# Patient Record
Sex: FEMALE | Race: WHITE | NOT HISPANIC OR LATINO | Employment: OTHER | URBAN - NONMETROPOLITAN AREA
[De-identification: names, ages, dates, MRNs, and addresses within clinical notes are randomized per-mention and may not be internally consistent; named-entity substitution may affect disease eponyms.]

---

## 2023-08-29 ENCOUNTER — OFFICE VISIT (OUTPATIENT)
Dept: PRIMARY CARE | Facility: CLINIC | Age: 68
End: 2023-08-29
Payer: MEDICARE

## 2023-08-29 VITALS
TEMPERATURE: 97.5 F | OXYGEN SATURATION: 98 % | BODY MASS INDEX: 31.98 KG/M2 | HEIGHT: 62 IN | HEART RATE: 64 BPM | SYSTOLIC BLOOD PRESSURE: 140 MMHG | WEIGHT: 173.8 LBS | DIASTOLIC BLOOD PRESSURE: 82 MMHG

## 2023-08-29 DIAGNOSIS — E04.1 THYROID NODULE: ICD-10-CM

## 2023-08-29 DIAGNOSIS — E23.7 ABNORMALITY OF PITUITARY GLAND (MULTI): ICD-10-CM

## 2023-08-29 DIAGNOSIS — R92.8 ABNORMAL MAMMOGRAM: ICD-10-CM

## 2023-08-29 DIAGNOSIS — I10 ESSENTIAL HYPERTENSION: ICD-10-CM

## 2023-08-29 DIAGNOSIS — E66.9 CLASS 1 OBESITY WITH SERIOUS COMORBIDITY AND BODY MASS INDEX (BMI) OF 31.0 TO 31.9 IN ADULT, UNSPECIFIED OBESITY TYPE: ICD-10-CM

## 2023-08-29 DIAGNOSIS — Z78.0 MENOPAUSE: ICD-10-CM

## 2023-08-29 DIAGNOSIS — E11.65 TYPE 2 DIABETES MELLITUS WITH HYPERGLYCEMIA, WITHOUT LONG-TERM CURRENT USE OF INSULIN (MULTI): Primary | ICD-10-CM

## 2023-08-29 DIAGNOSIS — E78.2 MIXED HYPERLIPIDEMIA: ICD-10-CM

## 2023-08-29 PROBLEM — G25.81 RESTLESS LEG: Status: ACTIVE | Noted: 2023-08-29

## 2023-08-29 PROBLEM — E11.9 TYPE 2 DIABETES MELLITUS (MULTI): Status: ACTIVE | Noted: 2023-08-29

## 2023-08-29 PROBLEM — M43.16 SPONDYLOLISTHESIS AT L4-L5 LEVEL: Status: ACTIVE | Noted: 2023-08-29

## 2023-08-29 PROCEDURE — 1036F TOBACCO NON-USER: CPT | Performed by: NURSE PRACTITIONER

## 2023-08-29 PROCEDURE — 3008F BODY MASS INDEX DOCD: CPT | Performed by: NURSE PRACTITIONER

## 2023-08-29 PROCEDURE — 1160F RVW MEDS BY RX/DR IN RCRD: CPT | Performed by: NURSE PRACTITIONER

## 2023-08-29 PROCEDURE — 3044F HG A1C LEVEL LT 7.0%: CPT | Performed by: NURSE PRACTITIONER

## 2023-08-29 PROCEDURE — 1159F MED LIST DOCD IN RCRD: CPT | Performed by: NURSE PRACTITIONER

## 2023-08-29 PROCEDURE — 99204 OFFICE O/P NEW MOD 45 MIN: CPT | Performed by: NURSE PRACTITIONER

## 2023-08-29 PROCEDURE — 3077F SYST BP >= 140 MM HG: CPT | Performed by: NURSE PRACTITIONER

## 2023-08-29 PROCEDURE — 3079F DIAST BP 80-89 MM HG: CPT | Performed by: NURSE PRACTITIONER

## 2023-08-29 PROCEDURE — 4010F ACE/ARB THERAPY RXD/TAKEN: CPT | Performed by: NURSE PRACTITIONER

## 2023-08-29 RX ORDER — LANOLIN ALCOHOL/MO/W.PET/CERES
CREAM (GRAM) TOPICAL
COMMUNITY

## 2023-08-29 RX ORDER — DULAGLUTIDE 3 MG/.5ML
INJECTION, SOLUTION SUBCUTANEOUS
COMMUNITY
End: 2023-09-11 | Stop reason: SDUPTHER

## 2023-08-29 RX ORDER — PIOGLITAZONEHYDROCHLORIDE 30 MG/1
30 TABLET ORAL DAILY
COMMUNITY
Start: 2020-08-07 | End: 2023-09-11 | Stop reason: SDUPTHER

## 2023-08-29 RX ORDER — VIT C/E/ZN/COPPR/LUTEIN/ZEAXAN 250MG-90MG
1 CAPSULE ORAL EVERY 24 HOURS
COMMUNITY

## 2023-08-29 RX ORDER — AMLODIPINE BESYLATE 5 MG/1
TABLET ORAL
COMMUNITY
End: 2023-11-28 | Stop reason: SDUPTHER

## 2023-08-29 RX ORDER — EZETIMIBE 10 MG/1
10 TABLET ORAL DAILY
COMMUNITY
Start: 2020-10-04 | End: 2023-11-28 | Stop reason: SDUPTHER

## 2023-08-29 RX ORDER — POTASSIUM &MAGNESIUM ASPARTATE 250-250 MG
CAPSULE ORAL
COMMUNITY
Start: 2020-11-09

## 2023-08-29 RX ORDER — LOSARTAN POTASSIUM 100 MG/1
100 TABLET ORAL DAILY
COMMUNITY
Start: 2020-08-07 | End: 2023-11-29

## 2023-08-29 RX ORDER — DIPHENHYDRAMINE HCL 25 MG
TABLET ORAL EVERY 24 HOURS
COMMUNITY

## 2023-08-29 RX ORDER — HYDROCHLOROTHIAZIDE 25 MG/1
25 TABLET ORAL DAILY
COMMUNITY
Start: 2020-09-01 | End: 2023-11-28 | Stop reason: SDUPTHER

## 2023-08-29 ASSESSMENT — ENCOUNTER SYMPTOMS
PSYCHIATRIC NEGATIVE: 1
VOMITING: 0
NAUSEA: 0
WEAKNESS: 0
ENDOCRINE NEGATIVE: 1
NUMBNESS: 0
COUGH: 0
HEMATOLOGIC/LYMPHATIC NEGATIVE: 1
RHINORRHEA: 0
SORE THROAT: 0
FEVER: 0
DIARRHEA: 0
LIGHT-HEADEDNESS: 0
WHEEZING: 0
EYES NEGATIVE: 1
CHILLS: 0
CONSTIPATION: 0
ARTHRALGIAS: 1
SINUS PRESSURE: 0
HEADACHES: 0
CHEST TIGHTNESS: 0
ACTIVITY CHANGE: 0
DIZZINESS: 0
PALPITATIONS: 0
ABDOMINAL PAIN: 0
ABDOMINAL DISTENTION: 0
ALLERGIC/IMMUNOLOGIC NEGATIVE: 1
SHORTNESS OF BREATH: 0
FATIGUE: 0
SINUS PAIN: 0

## 2023-08-29 NOTE — PROGRESS NOTES
Subjective   Patient ID: Leigha Lopez is a 68 y.o. female who presents for New Patient Visit, Hypothyroidism (Thyroid biopsy this Thursday), Diabetes, and Hypertension.    New patient to office, previous patient of Dr. Fuentes  Diabetes, controlled, last A1c 6.9%.  Patient taking Trulicity 3 mg weekly, Actos 30 mg daily.  She has lost quite a bit of weight since moving here a few years ago.  She has not been watching her diet very well lately and is trying to get back on track.  Discussed lifestyle changes including portion control, food choices, and increased activity.  Hypertension, controlled, taking amlodipine 5 mg daily, losartan 100 mg daily, chlorothiazide 25 mg daily.  Thyroid nodules, following with ENT, she is scheduled for biopsy this Thursday.  Obesity, BMI 31.  Patient is working to get her diet back on track, she has lost a significant amount of weight already.  Abnormal mammogram, left in March.  She is due for follow-up imaging in September.  Order placed.  Hospitalized April after syncopal episode.  She was worked up by cardiology.  Following with Dr. Cedillo yearly now.  Syncopal episode was felt not to be cardiac related.  Incidental finding of possible pituitary adenoma on CT done in ER.  Although the addendum stated there was no abnormality.  She had an MRI which showed possible microadenoma, 6-month follow-up was recommended.  Order placed for December.  DEXA scan ordered    Preventive:  CRC screen:  Mammogram: 3/2023 likely benign, follow-up 9/2023  DEXA scan: Ordered  PAP:  CT cardiac scoring:  Ophthalmology:  Podiatry:  Urine albumin: 3/2023       Review of Systems   Constitutional:  Negative for activity change, chills, fatigue and fever.   HENT:  Negative for congestion, rhinorrhea, sinus pressure, sinus pain and sore throat.    Eyes: Negative.    Respiratory:  Negative for cough, chest tightness, shortness of breath and wheezing.    Cardiovascular:  Negative for chest pain, palpitations  "and leg swelling.   Gastrointestinal:  Negative for abdominal distention, abdominal pain, constipation, diarrhea, nausea and vomiting.   Endocrine: Negative.    Genitourinary: Negative.    Musculoskeletal:  Positive for arthralgias.   Skin: Negative.    Allergic/Immunologic: Negative.    Neurological:  Negative for dizziness, syncope, weakness, light-headedness, numbness and headaches.   Hematological: Negative.    Psychiatric/Behavioral: Negative.     All other systems reviewed and are negative.      Objective   /82   Pulse 64   Temp 36.4 °C (97.5 °F)   Ht 1.575 m (5' 2\")   Wt 78.8 kg (173 lb 12.8 oz)   SpO2 98%   BMI 31.79 kg/m²     Physical Exam  Vitals and nursing note reviewed.   Constitutional:       General: She is not in acute distress.     Appearance: Normal appearance. She is obese. She is not ill-appearing.   HENT:      Head: Normocephalic and atraumatic.      Right Ear: Tympanic membrane, ear canal and external ear normal.      Left Ear: Tympanic membrane, ear canal and external ear normal.      Nose: Nose normal.      Mouth/Throat:      Mouth: Mucous membranes are moist.      Pharynx: Oropharynx is clear.   Eyes:      Pupils: Pupils are equal, round, and reactive to light.   Cardiovascular:      Rate and Rhythm: Normal rate and regular rhythm.      Pulses: Normal pulses.      Heart sounds: Normal heart sounds. No murmur heard.  Pulmonary:      Effort: Pulmonary effort is normal. No respiratory distress.      Breath sounds: Normal breath sounds. No wheezing.   Abdominal:      General: Bowel sounds are normal. There is no distension.      Palpations: Abdomen is soft.      Tenderness: There is no abdominal tenderness.   Musculoskeletal:         General: No tenderness. Normal range of motion.      Cervical back: Normal range of motion and neck supple.      Right lower leg: No edema.      Left lower leg: No edema.   Skin:     General: Skin is warm and dry.      Capillary Refill: Capillary refill " takes less than 2 seconds.      Coloration: Skin is not jaundiced.   Neurological:      General: No focal deficit present.      Mental Status: She is alert and oriented to person, place, and time.      Motor: No weakness.   Psychiatric:         Mood and Affect: Mood normal.         Behavior: Behavior normal.         Thought Content: Thought content normal.         Judgment: Judgment normal.       Assessment/Plan     #Diabetes, controlled  -Continue Trulicity 3 mg weekly, Actos 30 mg daily  -Low flat/cholesterol, low sodium, carbohydrate controlled diet  -Exercise as tolerated 150 minutes/week, gradually increase activity  -Weight loss  -Regular follow-up with ophthalmology and podiatry  #Hypertension, controlled  -Continue amlodipine 5 mg daily, HCTZ 25 mg daily, losartan 100 mg daily  -Low fat/cholesterol, low sodium, low carbohydrate diet  -Exercise as tolerated 150 minutes/week, increase activity slowly  -Maintain healthy weight  #Obesity, BMI 31  -Avoid sweets and sugary drinks  -Drink plenty of water  -Avoid processed foods and refined foods  -Eat fruits, vegetables, lean protein, whole grains  -Increase your activity level  #Hyperlipidemia  -Intolerant of statins  -Continue Zetia  -Follow-up with cardiology  #Thyroid nodule  -Thyroid biopsy upcoming on Thursday    Follow-up 3 months and as needed

## 2023-08-31 ENCOUNTER — HOSPITAL ENCOUNTER (OUTPATIENT)
Dept: DATA CONVERSION | Facility: HOSPITAL | Age: 68
Discharge: HOME | End: 2023-08-31
Payer: MEDICARE

## 2023-08-31 DIAGNOSIS — E04.1 NONTOXIC SINGLE THYROID NODULE: ICD-10-CM

## 2023-09-11 DIAGNOSIS — E11.65 TYPE 2 DIABETES MELLITUS WITH HYPERGLYCEMIA, WITHOUT LONG-TERM CURRENT USE OF INSULIN (MULTI): ICD-10-CM

## 2023-09-11 RX ORDER — DULAGLUTIDE 3 MG/.5ML
INJECTION, SOLUTION SUBCUTANEOUS
Qty: 6 ML | Refills: 0 | Status: SHIPPED | OUTPATIENT
Start: 2023-09-11 | End: 2023-11-28 | Stop reason: SDUPTHER

## 2023-09-11 RX ORDER — PIOGLITAZONEHYDROCHLORIDE 30 MG/1
30 TABLET ORAL DAILY
Qty: 90 TABLET | Refills: 0 | Status: SHIPPED | OUTPATIENT
Start: 2023-09-11 | End: 2023-11-28 | Stop reason: SDUPTHER

## 2023-09-15 ENCOUNTER — LAB (OUTPATIENT)
Dept: LAB | Facility: LAB | Age: 68
End: 2023-09-15
Payer: MEDICARE

## 2023-09-15 ENCOUNTER — OFFICE VISIT (OUTPATIENT)
Dept: PRIMARY CARE | Facility: CLINIC | Age: 68
End: 2023-09-15
Payer: MEDICARE

## 2023-09-15 VITALS
DIASTOLIC BLOOD PRESSURE: 74 MMHG | OXYGEN SATURATION: 100 % | WEIGHT: 180.4 LBS | HEART RATE: 73 BPM | SYSTOLIC BLOOD PRESSURE: 150 MMHG | TEMPERATURE: 97.1 F | BODY MASS INDEX: 33 KG/M2

## 2023-09-15 DIAGNOSIS — H61.22 IMPACTED CERUMEN OF LEFT EAR: ICD-10-CM

## 2023-09-15 DIAGNOSIS — R30.0 DYSURIA: ICD-10-CM

## 2023-09-15 DIAGNOSIS — R42 VERTIGO: Primary | ICD-10-CM

## 2023-09-15 DIAGNOSIS — I10 ESSENTIAL HYPERTENSION: ICD-10-CM

## 2023-09-15 DIAGNOSIS — R42 VERTIGO: ICD-10-CM

## 2023-09-15 LAB
ALANINE AMINOTRANSFERASE (SGPT) (U/L) IN SER/PLAS: 11 U/L (ref 7–45)
ALBUMIN (G/DL) IN SER/PLAS: 4.1 G/DL (ref 3.4–5)
ALKALINE PHOSPHATASE (U/L) IN SER/PLAS: 66 U/L (ref 33–136)
ANION GAP IN SER/PLAS: 14 MMOL/L (ref 10–20)
APPEARANCE, URINE: CLEAR
ASPARTATE AMINOTRANSFERASE (SGOT) (U/L) IN SER/PLAS: 15 U/L (ref 9–39)
BASOPHILS (10*3/UL) IN BLOOD BY AUTOMATED COUNT: 0.05 X10E9/L (ref 0–0.1)
BASOPHILS/100 LEUKOCYTES IN BLOOD BY AUTOMATED COUNT: 0.6 % (ref 0–2)
BILIRUBIN TOTAL (MG/DL) IN SER/PLAS: 0.4 MG/DL (ref 0–1.2)
BILIRUBIN, URINE: NEGATIVE
BLOOD, URINE: NEGATIVE
CALCIUM (MG/DL) IN SER/PLAS: 9.4 MG/DL (ref 8.6–10.3)
CARBON DIOXIDE, TOTAL (MMOL/L) IN SER/PLAS: 25 MMOL/L (ref 21–32)
CHLORIDE (MMOL/L) IN SER/PLAS: 104 MMOL/L (ref 98–107)
COLOR, URINE: NORMAL
CREATININE (MG/DL) IN SER/PLAS: 0.86 MG/DL (ref 0.5–1.05)
EOSINOPHILS (10*3/UL) IN BLOOD BY AUTOMATED COUNT: 0.18 X10E9/L (ref 0–0.7)
EOSINOPHILS/100 LEUKOCYTES IN BLOOD BY AUTOMATED COUNT: 2 % (ref 0–6)
ERYTHROCYTE DISTRIBUTION WIDTH (RATIO) BY AUTOMATED COUNT: 13 % (ref 11.5–14.5)
ERYTHROCYTE MEAN CORPUSCULAR HEMOGLOBIN CONCENTRATION (G/DL) BY AUTOMATED: 32.9 G/DL (ref 32–36)
ERYTHROCYTE MEAN CORPUSCULAR VOLUME (FL) BY AUTOMATED COUNT: 97 FL (ref 80–100)
ERYTHROCYTES (10*6/UL) IN BLOOD BY AUTOMATED COUNT: 4.34 X10E12/L (ref 4–5.2)
GFR FEMALE: 73 ML/MIN/1.73M2
GLUCOSE (MG/DL) IN SER/PLAS: 128 MG/DL (ref 74–99)
GLUCOSE, URINE: NEGATIVE MG/DL
HEMATOCRIT (%) IN BLOOD BY AUTOMATED COUNT: 41.9 % (ref 36–46)
HEMOGLOBIN (G/DL) IN BLOOD: 13.8 G/DL (ref 12–16)
IMMATURE GRANULOCYTES/100 LEUKOCYTES IN BLOOD BY AUTOMATED COUNT: 0.3 % (ref 0–0.9)
KETONES, URINE: NEGATIVE MG/DL
LEUKOCYTE ESTERASE, URINE: NEGATIVE
LEUKOCYTES (10*3/UL) IN BLOOD BY AUTOMATED COUNT: 8.9 X10E9/L (ref 4.4–11.3)
LYMPHOCYTES (10*3/UL) IN BLOOD BY AUTOMATED COUNT: 2.67 X10E9/L (ref 1.2–4.8)
LYMPHOCYTES/100 LEUKOCYTES IN BLOOD BY AUTOMATED COUNT: 30 % (ref 13–44)
MONOCYTES (10*3/UL) IN BLOOD BY AUTOMATED COUNT: 0.61 X10E9/L (ref 0.1–1)
MONOCYTES/100 LEUKOCYTES IN BLOOD BY AUTOMATED COUNT: 6.9 % (ref 2–10)
NEUTROPHILS (10*3/UL) IN BLOOD BY AUTOMATED COUNT: 5.35 X10E9/L (ref 1.2–7.7)
NEUTROPHILS/100 LEUKOCYTES IN BLOOD BY AUTOMATED COUNT: 60.2 % (ref 40–80)
NITRITE, URINE: NEGATIVE
PH, URINE: 7 (ref 5–8)
PLATELETS (10*3/UL) IN BLOOD AUTOMATED COUNT: 161 X10E9/L (ref 150–450)
POTASSIUM (MMOL/L) IN SER/PLAS: 4 MMOL/L (ref 3.5–5.3)
PROTEIN TOTAL: 7.2 G/DL (ref 6.4–8.2)
PROTEIN, URINE: NEGATIVE MG/DL
SODIUM (MMOL/L) IN SER/PLAS: 139 MMOL/L (ref 136–145)
SPECIFIC GRAVITY, URINE: 1.01 (ref 1–1.03)
UREA NITROGEN (MG/DL) IN SER/PLAS: 27 MG/DL (ref 6–23)
UROBILINOGEN, URINE: <2 MG/DL (ref 0–1.9)

## 2023-09-15 PROCEDURE — 3044F HG A1C LEVEL LT 7.0%: CPT | Performed by: NURSE PRACTITIONER

## 2023-09-15 PROCEDURE — 3008F BODY MASS INDEX DOCD: CPT | Performed by: NURSE PRACTITIONER

## 2023-09-15 PROCEDURE — 85025 COMPLETE CBC W/AUTO DIFF WBC: CPT

## 2023-09-15 PROCEDURE — 80053 COMPREHEN METABOLIC PANEL: CPT

## 2023-09-15 PROCEDURE — 1036F TOBACCO NON-USER: CPT | Performed by: NURSE PRACTITIONER

## 2023-09-15 PROCEDURE — 3077F SYST BP >= 140 MM HG: CPT | Performed by: NURSE PRACTITIONER

## 2023-09-15 PROCEDURE — 4010F ACE/ARB THERAPY RXD/TAKEN: CPT | Performed by: NURSE PRACTITIONER

## 2023-09-15 PROCEDURE — 1160F RVW MEDS BY RX/DR IN RCRD: CPT | Performed by: NURSE PRACTITIONER

## 2023-09-15 PROCEDURE — 81003 URINALYSIS AUTO W/O SCOPE: CPT

## 2023-09-15 PROCEDURE — 99214 OFFICE O/P EST MOD 30 MIN: CPT | Performed by: NURSE PRACTITIONER

## 2023-09-15 PROCEDURE — 36415 COLL VENOUS BLD VENIPUNCTURE: CPT

## 2023-09-15 PROCEDURE — 1159F MED LIST DOCD IN RCRD: CPT | Performed by: NURSE PRACTITIONER

## 2023-09-15 PROCEDURE — 3078F DIAST BP <80 MM HG: CPT | Performed by: NURSE PRACTITIONER

## 2023-09-15 RX ORDER — MECLIZINE HCL 12.5 MG 12.5 MG/1
12.5 TABLET ORAL 3 TIMES DAILY PRN
Qty: 42 TABLET | Refills: 0 | Status: SHIPPED | OUTPATIENT
Start: 2023-09-15 | End: 2023-09-29

## 2023-09-15 ASSESSMENT — ENCOUNTER SYMPTOMS
FATIGUE: 0
EYES NEGATIVE: 1
CHEST TIGHTNESS: 0
ENDOCRINE NEGATIVE: 1
ABDOMINAL DISTENTION: 0
CONSTIPATION: 0
ACTIVITY CHANGE: 0
ABDOMINAL PAIN: 0
FEVER: 0
NAUSEA: 1
DIARRHEA: 0
ALLERGIC/IMMUNOLOGIC NEGATIVE: 1
SINUS PAIN: 0
SHORTNESS OF BREATH: 0
HEADACHES: 0
VOMITING: 0
LIGHT-HEADEDNESS: 0
ARTHRALGIAS: 1
COUGH: 0
NUMBNESS: 0
HEMATOLOGIC/LYMPHATIC NEGATIVE: 1
CHILLS: 1
RHINORRHEA: 0
WEAKNESS: 0
DIZZINESS: 1
SINUS PRESSURE: 0
PSYCHIATRIC NEGATIVE: 1
PALPITATIONS: 0
SORE THROAT: 0
DYSURIA: 1
WHEEZING: 0

## 2023-09-15 NOTE — PATIENT INSTRUCTIONS

## 2023-09-15 NOTE — PROGRESS NOTES
"Subjective   Patient ID: Leigha Lopez is a 68 y.o. female who presents for Dizziness, Nausea, Chills, Covid-19 Home Monitoring Visit (COVID negative), and Hypertension (Patient states BP has been low).    Dental work 9/7. Thyroid biopsy 8/31.  Sunday dysuria, \"bubble\" under rib cage. Chills since Tuesday  Denies fever, vomiting, diarrhea, chest pain, dyspnea, headache.  Vertigo after standing up from bed Tues morning, had blurred vision. Nausea, no vomiting.  Patient is unsteady due to vertigo.  Intact finger-to-nose.  Cranial nerves grossly intact.  No motor deficits.  Vertigo worse with looking up.  CBC, CMP unremarkable.  UA negative for UTI.  Stat CT of the head no acute ischemia or hemorrhage.  Start meclizine 3 times a day as needed.  Educated regarding fall precautions.  L cerumen impaction.  Patient to use Debrox for 4 days and return for ear flush.  Hypertension, controlled, taking amlodipine 5 mg daily, losartan 100 mg daily, chlorothiazide 25 mg daily    Lab on 09/15/2023  WBC                                           Date: 09/15/2023  Value: 8.9         Ref range: 4.4 - 11.3 x10E9*  Status: Final  RBC                                           Date: 09/15/2023  Value: 4.34        Ref range: 4.00 - 5.20 x10E*  Status: Final  Hemoglobin                                    Date: 09/15/2023  Value: 13.8        Ref range: 12.0 - 16.0 g/dL   Status: Final  Hematocrit                                    Date: 09/15/2023  Value: 41.9        Ref range: 36.0 - 46.0 %      Status: Final  MCV                                           Date: 09/15/2023  Value: 97          Ref range: 80 - 100 fL        Status: Final  MCHC                                          Date: 09/15/2023  Value: 32.9        Ref range: 32.0 - 36.0 g/dL   Status: Final  Platelets                                     Date: 09/15/2023  Value: 161         Ref range: 150 - 450 x10E9/L  Status: Final  RDW                                           Date: " 09/15/2023  Value: 13.0        Ref range: 11.5 - 14.5 %      Status: Final  Neutrophils %                                 Date: 09/15/2023  Value: 60.2        Ref range: 40.0 - 80.0 %      Status: Final  Immature Granulocytes %, Automated            Date: 09/15/2023  Value: 0.3         Ref range: 0.0 - 0.9 %        Status: Final                Comment:  Immature Granulocyte Count (IG) includes promyelocytes,    myelocytes and metamyelocytes but does not include bands.   Percent differential counts (%) should be interpreted in the   context of the absolute cell counts (cells/L).  Lymphocytes %                                 Date: 09/15/2023  Value: 30.0        Ref range: 13.0 - 44.0 %      Status: Final  Monocytes %                                   Date: 09/15/2023  Value: 6.9         Ref range: 2.0 - 10.0 %       Status: Final  Eosinophils %                                 Date: 09/15/2023  Value: 2.0         Ref range: 0.0 - 6.0 %        Status: Final  Basophils %                                   Date: 09/15/2023  Value: 0.6         Ref range: 0.0 - 2.0 %        Status: Final  Neutrophils Absolute                          Date: 09/15/2023  Value: 5.35        Ref range: 1.20 - 7.70 x10E*  Status: Final  Lymphocytes Absolute                          Date: 09/15/2023  Value: 2.67        Ref range: 1.20 - 4.80 x10E*  Status: Final  Monocytes Absolute                            Date: 09/15/2023  Value: 0.61        Ref range: 0.10 - 1.00 x10E*  Status: Final  Eosinophils Absolute                          Date: 09/15/2023  Value: 0.18        Ref range: 0.00 - 0.70 x10E*  Status: Final  Basophils Absolute                            Date: 09/15/2023  Value: 0.05        Ref range: 0.00 - 0.10 x10E*  Status: Final  Glucose                                       Date: 09/15/2023  Value: 128 (H)     Ref range: 74 - 99 mg/dL      Status: Final  Sodium                                        Date: 09/15/2023  Value: 139          Ref range: 136 - 145 mmol/L   Status: Final  Potassium                                     Date: 09/15/2023  Value: 4.0         Ref range: 3.5 - 5.3 mmol/L   Status: Final  Chloride                                      Date: 09/15/2023  Value: 104         Ref range: 98 - 107 mmol/L    Status: Final  Bicarbonate                                   Date: 09/15/2023  Value: 25          Ref range: 21 - 32 mmol/L     Status: Final  Anion Gap                                     Date: 09/15/2023  Value: 14          Ref range: 10 - 20 mmol/L     Status: Final  Urea Nitrogen                                 Date: 09/15/2023  Value: 27 (H)      Ref range: 6 - 23 mg/dL       Status: Final  Creatinine                                    Date: 09/15/2023  Value: 0.86        Ref range: 0.50 - 1.05 mg/dL  Status: Final  GFR Female                                    Date: 09/15/2023  Value: 73          Ref range: >90 mL/min/1.73m2  Status: Final                Comment:  CALCULATIONS OF ESTIMATED GFR ARE PERFORMED   USING THE 2021 CKD-EPI STUDY REFIT EQUATION   WITHOUT THE RACE VARIABLE FOR THE IDMS-TRACEABLE   CREATININE METHODS.    https://jasn.asnjournals.org/content/early/2021/09/22/ASN.8477054105  Calcium                                       Date: 09/15/2023  Value: 9.4         Ref range: 8.6 - 10.3 mg/dL   Status: Final  Albumin                                       Date: 09/15/2023  Value: 4.1         Ref range: 3.4 - 5.0 g/dL     Status: Final  Alkaline Phosphatase                          Date: 09/15/2023  Value: 66          Ref range: 33 - 136 U/L       Status: Final  Total Protein                                 Date: 09/15/2023  Value: 7.2         Ref range: 6.4 - 8.2 g/dL     Status: Final  AST                                           Date: 09/15/2023  Value: 15          Ref range: 9 - 39 U/L         Status: Final  Total Bilirubin                               Date: 09/15/2023  Value: 0.4         Ref range: 0.0 - 1.2  mg/dL    Status: Final  ALT (SGPT)                                    Date: 09/15/2023  Value: 11          Ref range: 7 - 45 U/L         Status: Final                Comment:  Patients treated with Sulfasalazine may generate    falsely decreased results for ALT.  Color, Urine                                  Date: 09/15/2023  Value: STRAW       Ref range: STRAW,YELLOW       Status: Final  Appearance, Urine                             Date: 09/15/2023  Value: CLEAR       Ref range: CLEAR              Status: Final  Specific Gravity, Urine                       Date: 09/15/2023  Value: 1.006       Ref range: 1.005 - 1.035      Status: Final  pH, Urine                                     Date: 09/15/2023  Value: 7.0         Ref range: 5.0 - 8.0          Status: Final  Protein, Urine                                Date: 09/15/2023  Value: NEGATIVE    Ref range: NEGATIVE mg/dL     Status: Final  Glucose, Urine                                Date: 09/15/2023  Value: NEGATIVE    Ref range: NEGATIVE mg/dL     Status: Final  Blood, Urine                                  Date: 09/15/2023  Value: NEGATIVE    Ref range: NEGATIVE           Status: Final  Ketones, Urine                                Date: 09/15/2023  Value: NEGATIVE    Ref range: NEGATIVE mg/dL     Status: Final  Bilirubin, Urine                              Date: 09/15/2023  Value: NEGATIVE    Ref range: NEGATIVE           Status: Final  Urobilinogen, Urine                           Date: 09/15/2023  Value: <2.0        Ref range: 0.0 - 1.9 mg/dL    Status: Final  Nitrite, Urine                                Date: 09/15/2023  Value: NEGATIVE    Ref range: NEGATIVE           Status: Final  Leukocyte Esterase, Urine                     Date: 09/15/2023  Value: NEGATIVE    Ref range: NEGATIVE           Status: Final  ------------  === 09/15/23 ===    CT HEAD WO IV CONTRAST    - Impression -  No evidence of acute cortical ischemia or intracranial hemorrhage.          Review of Systems   Constitutional:  Positive for chills. Negative for activity change, fatigue and fever.   HENT:  Negative for congestion, rhinorrhea, sinus pressure, sinus pain and sore throat.    Eyes: Negative.    Respiratory:  Negative for cough, chest tightness, shortness of breath and wheezing.    Cardiovascular:  Negative for chest pain, palpitations and leg swelling.   Gastrointestinal:  Positive for nausea. Negative for abdominal distention, abdominal pain, constipation, diarrhea and vomiting.   Endocrine: Negative.    Genitourinary:  Positive for dysuria.   Musculoskeletal:  Positive for arthralgias.   Skin: Negative.    Allergic/Immunologic: Negative.    Neurological:  Positive for dizziness. Negative for syncope, weakness, light-headedness, numbness and headaches.   Hematological: Negative.    Psychiatric/Behavioral: Negative.     All other systems reviewed and are negative.      Objective   /74   Pulse 73   Temp 36.2 °C (97.1 °F)   Wt 81.8 kg (180 lb 6.4 oz)   SpO2 100%   BMI 33.00 kg/m²     Physical Exam  Vitals and nursing note reviewed.   Constitutional:       General: She is not in acute distress.     Appearance: Normal appearance. She is obese. She is not ill-appearing.   HENT:      Head: Normocephalic and atraumatic.      Right Ear: Tympanic membrane, ear canal and external ear normal.      Left Ear: External ear normal. There is impacted cerumen.      Nose: Nose normal.      Mouth/Throat:      Mouth: Mucous membranes are moist.      Pharynx: Oropharynx is clear.   Eyes:      Pupils: Pupils are equal, round, and reactive to light.   Cardiovascular:      Rate and Rhythm: Normal rate and regular rhythm.      Pulses: Normal pulses.      Heart sounds: Normal heart sounds. No murmur heard.  Pulmonary:      Effort: Pulmonary effort is normal. No respiratory distress.      Breath sounds: Normal breath sounds. No wheezing.   Abdominal:      General: Bowel sounds are normal. There is no  distension.      Palpations: Abdomen is soft.      Tenderness: There is no abdominal tenderness.   Musculoskeletal:         General: No tenderness. Normal range of motion.      Cervical back: Normal range of motion and neck supple.      Right lower leg: No edema.      Left lower leg: No edema.   Skin:     General: Skin is warm and dry.      Capillary Refill: Capillary refill takes less than 2 seconds.      Coloration: Skin is not jaundiced.   Neurological:      General: No focal deficit present.      Mental Status: She is alert and oriented to person, place, and time.      Cranial Nerves: Cranial nerves 2-12 are intact.      Sensory: Sensation is intact.      Motor: Motor function is intact. No weakness.      Coordination: Coordination is intact.      Gait: Gait abnormal (unsteady).   Psychiatric:         Mood and Affect: Mood normal.         Behavior: Behavior normal.         Thought Content: Thought content normal.         Judgment: Judgment normal.         Assessment/Plan     # Vertigo  -start meclizine  -Stay well-hydrated  -Call the office if symptoms worsen or do not improve  **Fall precautions:  -Keep walkways well lit and clear of tripping hazards--remove throw rugs  -Avoid wearing loose close that can be tripped over--wear shoes to prevent slipping  -Use assistive devices such as grab bars, shower chair as recommended  -Keep your eye exam and glasses up-to-date  -Keep active and keep moving   #Left Cerumen impaction  -Use Debrox drops for 4 days and then return for ear flush    Follow-up for ear flush and as needed  Patient was identified as a fall risk. Risk prevention instructions provided.

## 2023-10-09 ENCOUNTER — APPOINTMENT (OUTPATIENT)
Dept: PULMONOLOGY | Facility: CLINIC | Age: 68
End: 2023-10-09
Payer: MEDICARE

## 2023-11-13 ENCOUNTER — APPOINTMENT (OUTPATIENT)
Dept: PULMONOLOGY | Facility: CLINIC | Age: 68
End: 2023-11-13
Payer: MEDICARE

## 2023-11-22 PROBLEM — T46.6X5A STATIN MYOPATHY: Status: ACTIVE | Noted: 2023-11-22

## 2023-11-22 PROBLEM — G72.0 STATIN MYOPATHY: Status: ACTIVE | Noted: 2023-11-22

## 2023-11-28 ENCOUNTER — APPOINTMENT (OUTPATIENT)
Dept: PRIMARY CARE | Facility: CLINIC | Age: 68
End: 2023-11-28
Payer: MEDICARE

## 2023-11-28 DIAGNOSIS — I10 ESSENTIAL HYPERTENSION: ICD-10-CM

## 2023-11-28 DIAGNOSIS — E11.65 TYPE 2 DIABETES MELLITUS WITH HYPERGLYCEMIA, WITHOUT LONG-TERM CURRENT USE OF INSULIN (MULTI): ICD-10-CM

## 2023-11-28 RX ORDER — PIOGLITAZONEHYDROCHLORIDE 30 MG/1
30 TABLET ORAL DAILY
Qty: 90 TABLET | Refills: 0 | Status: SHIPPED | OUTPATIENT
Start: 2023-11-28 | End: 2023-12-12 | Stop reason: SDUPTHER

## 2023-11-28 RX ORDER — DULAGLUTIDE 3 MG/.5ML
INJECTION, SOLUTION SUBCUTANEOUS
Qty: 6 ML | Refills: 0 | Status: SHIPPED | OUTPATIENT
Start: 2023-11-28 | End: 2023-12-12 | Stop reason: SDUPTHER

## 2023-11-28 RX ORDER — AMLODIPINE BESYLATE 5 MG/1
TABLET ORAL
Qty: 90 TABLET | Refills: 0 | Status: SHIPPED | OUTPATIENT
Start: 2023-11-28 | End: 2023-12-12 | Stop reason: SDUPTHER

## 2023-11-28 RX ORDER — HYDROCHLOROTHIAZIDE 25 MG/1
25 TABLET ORAL DAILY
Qty: 90 TABLET | Refills: 0 | Status: SHIPPED | OUTPATIENT
Start: 2023-11-28 | End: 2023-12-12 | Stop reason: SDUPTHER

## 2023-11-28 RX ORDER — EZETIMIBE 10 MG/1
10 TABLET ORAL DAILY
Qty: 90 TABLET | Refills: 0 | Status: SHIPPED | OUTPATIENT
Start: 2023-11-28 | End: 2023-12-12 | Stop reason: SDUPTHER

## 2023-11-29 ENCOUNTER — HOSPITAL ENCOUNTER (OUTPATIENT)
Dept: RADIOLOGY | Facility: HOSPITAL | Age: 68
Discharge: HOME | End: 2023-11-29
Payer: MEDICARE

## 2023-11-29 DIAGNOSIS — Z78.0 MENOPAUSE: ICD-10-CM

## 2023-11-29 DIAGNOSIS — R92.8 ABNORMAL MAMMOGRAM: ICD-10-CM

## 2023-11-29 DIAGNOSIS — I10 ESSENTIAL HYPERTENSION: ICD-10-CM

## 2023-11-29 PROCEDURE — 77065 DX MAMMO INCL CAD UNI: CPT | Mod: LT

## 2023-11-29 PROCEDURE — G0279 TOMOSYNTHESIS, MAMMO: HCPCS | Mod: LEFT SIDE | Performed by: RADIOLOGY

## 2023-11-29 PROCEDURE — 77065 DX MAMMO INCL CAD UNI: CPT | Mod: LEFT SIDE | Performed by: RADIOLOGY

## 2023-11-29 PROCEDURE — 77085 DXA BONE DENSITY AXL VRT FX: CPT | Performed by: RADIOLOGY

## 2023-11-29 PROCEDURE — 77085 DXA BONE DENSITY AXL VRT FX: CPT

## 2023-11-29 RX ORDER — HYDROCHLOROTHIAZIDE 25 MG/1
25 TABLET ORAL DAILY
Qty: 90 TABLET | Refills: 1 | OUTPATIENT
Start: 2023-11-29

## 2023-11-29 RX ORDER — EZETIMIBE 10 MG/1
10 TABLET ORAL DAILY
Qty: 90 TABLET | Refills: 1 | OUTPATIENT
Start: 2023-11-29

## 2023-11-29 RX ORDER — LOSARTAN POTASSIUM 100 MG/1
100 TABLET ORAL DAILY
Qty: 90 TABLET | Refills: 0 | Status: SHIPPED | OUTPATIENT
Start: 2023-11-29 | End: 2023-12-12 | Stop reason: SDUPTHER

## 2023-12-12 ENCOUNTER — OFFICE VISIT (OUTPATIENT)
Dept: PRIMARY CARE | Facility: CLINIC | Age: 68
End: 2023-12-12
Payer: MEDICARE

## 2023-12-12 VITALS
OXYGEN SATURATION: 99 % | TEMPERATURE: 96 F | DIASTOLIC BLOOD PRESSURE: 60 MMHG | BODY MASS INDEX: 33.23 KG/M2 | SYSTOLIC BLOOD PRESSURE: 132 MMHG | WEIGHT: 181.7 LBS | HEART RATE: 81 BPM

## 2023-12-12 DIAGNOSIS — Z12.12 SCREENING FOR COLORECTAL CANCER: Primary | ICD-10-CM

## 2023-12-12 DIAGNOSIS — Z12.11 SCREENING FOR COLORECTAL CANCER: Primary | ICD-10-CM

## 2023-12-12 DIAGNOSIS — I10 ESSENTIAL HYPERTENSION: ICD-10-CM

## 2023-12-12 DIAGNOSIS — E11.65 TYPE 2 DIABETES MELLITUS WITH HYPERGLYCEMIA, WITHOUT LONG-TERM CURRENT USE OF INSULIN (MULTI): ICD-10-CM

## 2023-12-12 LAB
POC FINGERSTICK BLOOD GLUCOSE: 220 MG/DL (ref 70–100)
POC HEMOGLOBIN A1C: 5.8 % (ref 4.2–6.5)

## 2023-12-12 PROCEDURE — 83036 HEMOGLOBIN GLYCOSYLATED A1C: CPT | Performed by: NURSE PRACTITIONER

## 2023-12-12 PROCEDURE — 82962 GLUCOSE BLOOD TEST: CPT | Performed by: NURSE PRACTITIONER

## 2023-12-12 PROCEDURE — 3075F SYST BP GE 130 - 139MM HG: CPT | Performed by: NURSE PRACTITIONER

## 2023-12-12 PROCEDURE — 3078F DIAST BP <80 MM HG: CPT | Performed by: NURSE PRACTITIONER

## 2023-12-12 PROCEDURE — 4010F ACE/ARB THERAPY RXD/TAKEN: CPT | Performed by: NURSE PRACTITIONER

## 2023-12-12 PROCEDURE — 1036F TOBACCO NON-USER: CPT | Performed by: NURSE PRACTITIONER

## 2023-12-12 PROCEDURE — 99214 OFFICE O/P EST MOD 30 MIN: CPT | Performed by: NURSE PRACTITIONER

## 2023-12-12 PROCEDURE — 1160F RVW MEDS BY RX/DR IN RCRD: CPT | Performed by: NURSE PRACTITIONER

## 2023-12-12 PROCEDURE — 3008F BODY MASS INDEX DOCD: CPT | Performed by: NURSE PRACTITIONER

## 2023-12-12 PROCEDURE — 1159F MED LIST DOCD IN RCRD: CPT | Performed by: NURSE PRACTITIONER

## 2023-12-12 PROCEDURE — 3044F HG A1C LEVEL LT 7.0%: CPT | Performed by: NURSE PRACTITIONER

## 2023-12-12 RX ORDER — AMLODIPINE BESYLATE 5 MG/1
TABLET ORAL
Qty: 90 TABLET | Refills: 0 | Status: SHIPPED | OUTPATIENT
Start: 2023-12-12 | End: 2024-03-04 | Stop reason: SDUPTHER

## 2023-12-12 RX ORDER — LOSARTAN POTASSIUM 100 MG/1
100 TABLET ORAL DAILY
Qty: 90 TABLET | Refills: 0 | Status: SHIPPED | OUTPATIENT
Start: 2023-12-12 | End: 2024-03-04 | Stop reason: SDUPTHER

## 2023-12-12 RX ORDER — HYDROCHLOROTHIAZIDE 25 MG/1
25 TABLET ORAL DAILY
Qty: 90 TABLET | Refills: 0 | Status: SHIPPED | OUTPATIENT
Start: 2023-12-12 | End: 2024-03-04 | Stop reason: SDUPTHER

## 2023-12-12 RX ORDER — PIOGLITAZONEHYDROCHLORIDE 30 MG/1
30 TABLET ORAL DAILY
Qty: 90 TABLET | Refills: 0 | Status: SHIPPED | OUTPATIENT
Start: 2023-12-12 | End: 2024-03-04 | Stop reason: SDUPTHER

## 2023-12-12 RX ORDER — DULAGLUTIDE 3 MG/.5ML
INJECTION, SOLUTION SUBCUTANEOUS
Qty: 6 ML | Refills: 0 | Status: SHIPPED | OUTPATIENT
Start: 2023-12-12 | End: 2024-03-04 | Stop reason: SDUPTHER

## 2023-12-12 RX ORDER — EZETIMIBE 10 MG/1
10 TABLET ORAL DAILY
Qty: 90 TABLET | Refills: 0 | Status: SHIPPED | OUTPATIENT
Start: 2023-12-12 | End: 2024-03-04 | Stop reason: SDUPTHER

## 2023-12-12 ASSESSMENT — ENCOUNTER SYMPTOMS
DIARRHEA: 0
SINUS PRESSURE: 0
SHORTNESS OF BREATH: 0
NAUSEA: 0
ALLERGIC/IMMUNOLOGIC NEGATIVE: 1
CHEST TIGHTNESS: 0
ABDOMINAL DISTENTION: 0
RHINORRHEA: 0
ACTIVITY CHANGE: 0
FEVER: 0
EYES NEGATIVE: 1
WHEEZING: 0
DYSURIA: 0
DIZZINESS: 0
LIGHT-HEADEDNESS: 0
HEMATOLOGIC/LYMPHATIC NEGATIVE: 1
PSYCHIATRIC NEGATIVE: 1
PALPITATIONS: 0
WEAKNESS: 0
ARTHRALGIAS: 1
FATIGUE: 0
CONSTIPATION: 0
SORE THROAT: 0
ABDOMINAL PAIN: 0
HEADACHES: 0
ENDOCRINE NEGATIVE: 1
CHILLS: 0
NUMBNESS: 0
VOMITING: 0
COUGH: 0
SINUS PAIN: 0

## 2023-12-12 NOTE — PROGRESS NOTES
Subjective   Patient ID: Marina Lopez is a 68 y.o. female who presents for Diabetes, Hypertension, Results, and Med Refill.    Diabetes, controlled, the 5.8%.  Patient taking Trulicity 3 mg weekly, Actos 30 mg daily.  She has lost quite a bit of weight since moving here a few years ago.  Discussed lifestyle changes including portion control, food choices, and increased activity.  Hypertension, controlled, taking amlodipine 5 mg daily, losartan 100 mg daily, chlorothiazide 25 mg daily.  Thyroid nodules, biopsy showed benign nodules.  Following with ENT.  Vertigo, controlled with meclizine.  Worse with weather changes.  Obesity, BMI 33.  Patient is working to get her diet back on track, she has lost a significant amount of weight already.  Hospitalized April after syncopal episode.  She was worked up by cardiology.  Following with Dr. Cedillo yearly now.  Syncopal episode was felt not to be cardiac related.  Incidental finding of possible pituitary adenoma on CT done in ER.  Although the addendum stated there was no abnormality.  She had an MRI which showed possible microadenoma, 6-month follow-up was recommended.   Due for MRI this month  Hyperlipidemia, intolerant of statins.  Taking Zetia.    The 10-year ASCVD risk score (Meir DK, et al., 2019) is: 21.4%    Values used to calculate the score:      Age: 68 years      Sex: Female      Is Non- : No      Diabetic: Yes      Tobacco smoker: No      Systolic Blood Pressure: 132 mmHg      Is BP treated: Yes      HDL Cholesterol: 30.6 mg/dL      Total Cholesterol: 160 mg/dL    Preventive:  CRC screen:  Mammogram: Due 3/2024  DEXA scan: 11/2023 normal  PAP:  CT cardiac scoring:  Ophthalmology:  Podiatry:  Urine albumin: 3/2023    Office Visit on 12/12/2023  POC Fingerstick Blood Glucose                 Date: 12/12/2023  Value: 220 (A)     Ref range: 70 - 100 mg/dl     Status: Final  POC HEMOGLOBIN A1c                            Date:  12/12/2023  Value: 5.8         Ref range: 4.2 - 6.5 %        Status: Final         Review of Systems   Constitutional:  Negative for activity change, chills, fatigue and fever.   HENT:  Negative for congestion, rhinorrhea, sinus pressure, sinus pain and sore throat.    Eyes: Negative.    Respiratory:  Negative for cough, chest tightness, shortness of breath and wheezing.    Cardiovascular:  Negative for chest pain, palpitations and leg swelling.   Gastrointestinal:  Negative for abdominal distention, abdominal pain, constipation, diarrhea, nausea and vomiting.   Endocrine: Negative.    Genitourinary:  Negative for dysuria.   Musculoskeletal:  Positive for arthralgias.   Skin: Negative.    Allergic/Immunologic: Negative.    Neurological:  Negative for dizziness, syncope, weakness, light-headedness, numbness and headaches.   Hematological: Negative.    Psychiatric/Behavioral: Negative.     All other systems reviewed and are negative.      Objective   /60   Pulse 81   Temp 35.6 °C (96 °F)   Wt 82.4 kg (181 lb 11.2 oz)   SpO2 99%   BMI 33.23 kg/m²     Physical Exam  Vitals and nursing note reviewed.   Constitutional:       General: She is not in acute distress.     Appearance: Normal appearance. She is obese. She is not ill-appearing.   HENT:      Head: Normocephalic and atraumatic.      Right Ear: Tympanic membrane, ear canal and external ear normal.      Left Ear: Tympanic membrane, ear canal and external ear normal.      Nose: Nose normal.      Mouth/Throat:      Mouth: Mucous membranes are moist.      Pharynx: Oropharynx is clear.   Eyes:      Pupils: Pupils are equal, round, and reactive to light.   Cardiovascular:      Rate and Rhythm: Normal rate and regular rhythm.      Pulses: Normal pulses.      Heart sounds: Normal heart sounds. No murmur heard.  Pulmonary:      Effort: Pulmonary effort is normal. No respiratory distress.      Breath sounds: Normal breath sounds. No wheezing.   Abdominal:       General: Bowel sounds are normal. There is no distension.      Palpations: Abdomen is soft.      Tenderness: There is no abdominal tenderness.   Musculoskeletal:         General: No tenderness. Normal range of motion.      Cervical back: Normal range of motion and neck supple.      Right lower leg: No edema.      Left lower leg: No edema.   Skin:     General: Skin is warm and dry.      Capillary Refill: Capillary refill takes less than 2 seconds.      Coloration: Skin is not jaundiced.   Neurological:      General: No focal deficit present.      Mental Status: She is alert and oriented to person, place, and time.      Cranial Nerves: Cranial nerves 2-12 are intact.      Sensory: Sensation is intact.      Motor: Motor function is intact. No weakness.      Coordination: Coordination is intact.   Psychiatric:         Mood and Affect: Mood normal.         Behavior: Behavior normal.         Thought Content: Thought content normal.         Judgment: Judgment normal.         Assessment/Plan     #Diabetes, controlled  -Continue Trulicity 3 mg weekly, Actos 30 mg daily  -Low flat/cholesterol, low sodium, carbohydrate controlled diet  -Exercise as tolerated 150 minutes/week, gradually increase activity  -Weight loss  -Regular follow-up with ophthalmology and podiatry  #Hypertension, controlled  -Continue amlodipine 5 mg daily, HCTZ 25 mg daily, losartan 100 mg daily  -Low fat/cholesterol, low sodium, low carbohydrate diet  -Exercise as tolerated 150 minutes/week, increase activity slowly  -Maintain healthy weight  #Obesity, BMI 33  -Avoid sweets and sugary drinks  -Drink plenty of water  -Avoid processed foods and refined foods  -Eat fruits, vegetables, lean protein, whole grains  -Increase your activity level  #Hyperlipidemia  -Intolerant of statins  -Continue Zetia  -Low fat/low cholesterol diet  -Eat more fresh foods  -Avoid processed/prepackaged/fast foods  -Gradually increase physical activity  -Weight loss  -Follow-up  with cardiology  #Thyroid nodule  -Follow-up with ENT  # Possible pituitary microadenoma  -Due for 6-month follow-up MRI this month    Follow-up in 3 months for Medicare physical and as needed  Patient was identified as a fall risk. Risk prevention instructions provided.

## 2023-12-12 NOTE — PATIENT INSTRUCTIONS

## 2024-03-04 DIAGNOSIS — E11.65 TYPE 2 DIABETES MELLITUS WITH HYPERGLYCEMIA, WITHOUT LONG-TERM CURRENT USE OF INSULIN (MULTI): ICD-10-CM

## 2024-03-04 DIAGNOSIS — I10 ESSENTIAL HYPERTENSION: ICD-10-CM

## 2024-03-04 RX ORDER — DULAGLUTIDE 3 MG/.5ML
INJECTION, SOLUTION SUBCUTANEOUS
Qty: 6 ML | Refills: 0 | Status: SHIPPED | OUTPATIENT
Start: 2024-03-04 | End: 2024-05-30 | Stop reason: SDUPTHER

## 2024-03-04 RX ORDER — PIOGLITAZONEHYDROCHLORIDE 30 MG/1
30 TABLET ORAL DAILY
Qty: 90 TABLET | Refills: 0 | Status: SHIPPED | OUTPATIENT
Start: 2024-03-04 | End: 2024-05-30 | Stop reason: SDUPTHER

## 2024-03-04 RX ORDER — HYDROCHLOROTHIAZIDE 25 MG/1
25 TABLET ORAL DAILY
Qty: 90 TABLET | Refills: 0 | Status: SHIPPED | OUTPATIENT
Start: 2024-03-04 | End: 2024-05-30 | Stop reason: SDUPTHER

## 2024-03-04 RX ORDER — AMLODIPINE BESYLATE 5 MG/1
TABLET ORAL
Qty: 90 TABLET | Refills: 0 | Status: SHIPPED | OUTPATIENT
Start: 2024-03-04 | End: 2024-05-30 | Stop reason: SDUPTHER

## 2024-03-04 RX ORDER — LOSARTAN POTASSIUM 100 MG/1
100 TABLET ORAL DAILY
Qty: 90 TABLET | Refills: 0 | Status: SHIPPED | OUTPATIENT
Start: 2024-03-04 | End: 2024-05-30 | Stop reason: SDUPTHER

## 2024-03-04 RX ORDER — EZETIMIBE 10 MG/1
10 TABLET ORAL DAILY
Qty: 90 TABLET | Refills: 0 | Status: SHIPPED | OUTPATIENT
Start: 2024-03-04 | End: 2024-05-30 | Stop reason: SDUPTHER

## 2024-03-14 ENCOUNTER — APPOINTMENT (OUTPATIENT)
Dept: GASTROENTEROLOGY | Facility: HOSPITAL | Age: 69
End: 2024-03-14
Payer: MEDICARE

## 2024-04-08 ENCOUNTER — APPOINTMENT (OUTPATIENT)
Dept: RADIOLOGY | Facility: HOSPITAL | Age: 69
End: 2024-04-08
Payer: MEDICARE

## 2024-04-08 ENCOUNTER — HOSPITAL ENCOUNTER (EMERGENCY)
Facility: HOSPITAL | Age: 69
Discharge: HOME | End: 2024-04-08
Attending: EMERGENCY MEDICINE
Payer: MEDICARE

## 2024-04-08 VITALS
SYSTOLIC BLOOD PRESSURE: 159 MMHG | BODY MASS INDEX: 33.18 KG/M2 | HEART RATE: 75 BPM | OXYGEN SATURATION: 100 % | WEIGHT: 187.24 LBS | HEIGHT: 63 IN | DIASTOLIC BLOOD PRESSURE: 76 MMHG | RESPIRATION RATE: 16 BRPM | TEMPERATURE: 97.1 F

## 2024-04-08 DIAGNOSIS — S29.011A STRAIN OF TENDON OF LEFT HALF OF ANTERIOR CHEST WALL: ICD-10-CM

## 2024-04-08 DIAGNOSIS — M19.019 ARTHRITIS, SHOULDER REGION: ICD-10-CM

## 2024-04-08 DIAGNOSIS — S46.912A STRAIN OF LEFT SHOULDER, INITIAL ENCOUNTER: Primary | ICD-10-CM

## 2024-04-08 PROCEDURE — 2500000004 HC RX 250 GENERAL PHARMACY W/ HCPCS (ALT 636 FOR OP/ED): Performed by: PHYSICIAN ASSISTANT

## 2024-04-08 PROCEDURE — 96372 THER/PROPH/DIAG INJ SC/IM: CPT | Performed by: PHYSICIAN ASSISTANT

## 2024-04-08 PROCEDURE — 73030 X-RAY EXAM OF SHOULDER: CPT | Mod: LEFT SIDE | Performed by: RADIOLOGY

## 2024-04-08 PROCEDURE — 73030 X-RAY EXAM OF SHOULDER: CPT | Mod: LT

## 2024-04-08 PROCEDURE — 71101 X-RAY EXAM UNILAT RIBS/CHEST: CPT | Mod: LT

## 2024-04-08 PROCEDURE — 71101 X-RAY EXAM UNILAT RIBS/CHEST: CPT | Mod: LEFT SIDE | Performed by: RADIOLOGY

## 2024-04-08 PROCEDURE — 99284 EMERGENCY DEPT VISIT MOD MDM: CPT | Mod: 25

## 2024-04-08 RX ORDER — MELOXICAM 7.5 MG/1
7.5 TABLET ORAL DAILY
Qty: 10 TABLET | Refills: 0 | Status: SHIPPED | OUTPATIENT
Start: 2024-04-08 | End: 2024-04-18

## 2024-04-08 RX ORDER — KETOROLAC TROMETHAMINE 15 MG/ML
15 INJECTION, SOLUTION INTRAMUSCULAR; INTRAVENOUS ONCE
Status: COMPLETED | OUTPATIENT
Start: 2024-04-08 | End: 2024-04-08

## 2024-04-08 RX ORDER — CYCLOBENZAPRINE HCL 5 MG
5 TABLET ORAL 2 TIMES DAILY PRN
Qty: 10 TABLET | Refills: 0 | Status: SHIPPED | OUTPATIENT
Start: 2024-04-08 | End: 2024-04-18

## 2024-04-08 RX ADMIN — KETOROLAC TROMETHAMINE 15 MG: 15 INJECTION INTRAMUSCULAR; INTRAVENOUS at 11:19

## 2024-04-08 ASSESSMENT — PAIN DESCRIPTION - ORIENTATION: ORIENTATION: LEFT

## 2024-04-08 ASSESSMENT — PAIN SCALES - GENERAL: PAINLEVEL_OUTOF10: 4

## 2024-04-08 ASSESSMENT — PAIN DESCRIPTION - DESCRIPTORS: DESCRIPTORS: SHARP;ACHING;STABBING

## 2024-04-08 ASSESSMENT — PAIN DESCRIPTION - LOCATION: LOCATION: SHOULDER

## 2024-04-08 ASSESSMENT — COLUMBIA-SUICIDE SEVERITY RATING SCALE - C-SSRS
2. HAVE YOU ACTUALLY HAD ANY THOUGHTS OF KILLING YOURSELF?: NO
6. HAVE YOU EVER DONE ANYTHING, STARTED TO DO ANYTHING, OR PREPARED TO DO ANYTHING TO END YOUR LIFE?: NO
1. IN THE PAST MONTH, HAVE YOU WISHED YOU WERE DEAD OR WISHED YOU COULD GO TO SLEEP AND NOT WAKE UP?: NO

## 2024-04-08 ASSESSMENT — PAIN - FUNCTIONAL ASSESSMENT: PAIN_FUNCTIONAL_ASSESSMENT: 0-10

## 2024-04-08 ASSESSMENT — PAIN DESCRIPTION - PAIN TYPE: TYPE: ACUTE PAIN

## 2024-04-08 NOTE — ED PROVIDER NOTES
"HPI   Chief Complaint   Patient presents with    Shoulder Injury     Sates she was reaching for something in the back of her SUV when she felt and heard a popping noise from her left shoulder.       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 No pertinent past medical history.  Past Surgical History:   Procedure Laterality Date    OTHER SURGICAL HISTORY  11/09/2020    Hysterectomy    OTHER SURGICAL HISTORY  11/09/2020    Knee arthroscopy    TONSILLECTOMY       Family History   Problem Relation Name Age of Onset    Hyperlipidemia Mother      Hypertension Mother      Diabetes type II Mother      Diabetes Other      Hypertension Other      Cancer Other      Kidney disease Other       Social History     Tobacco Use    Smoking status: Never    Smokeless tobacco: Never   Vaping Use    Vaping Use: Never used   Substance Use Topics    Alcohol use: Never    Drug use: Never       Physical Exam   ED Triage Vitals [04/08/24 0856]   Temperature Heart Rate Respirations BP   36.2 °C (97.1 °F) 75 16 159/76      Pulse Ox Temp Source Heart Rate Source Patient Position   100 % Temporal -- --      BP Location FiO2 (%)     -- --       Physical Exam  Vitals and nursing note reviewed.   Constitutional:       General: She is not in acute distress.     Appearance: She is " well-developed.   HENT:      Head: Normocephalic and atraumatic.   Eyes:      Conjunctiva/sclera: Conjunctivae normal.   Cardiovascular:      Rate and Rhythm: Normal rate and regular rhythm.      Heart sounds: No murmur heard.     Comments: Positive tender with palp to the left anterior and lateral upper chest wall   No swelling no ecchymosis or deformity on exam  Pulmonary:      Effort: Pulmonary effort is normal. No respiratory distress.      Breath sounds: Normal breath sounds.   Abdominal:      Palpations: Abdomen is soft.      Tenderness: There is no abdominal tenderness.   Musculoskeletal:         General: No swelling.      Cervical back: Neck supple.      Comments: Positive tender with palp to the left anterior and posterior shoulder area and left upper mid and lateral chest area    No ecchymosis no discoloration or bruising no swelling    Pulses 2+ equal bilateral upper extremity   Skin:     General: Skin is warm and dry.      Capillary Refill: Capillary refill takes less than 2 seconds.   Neurological:      General: No focal deficit present.      Mental Status: She is alert.   Psychiatric:         Mood and Affect: Mood normal.         ED Course & MDM   Diagnoses as of 04/08/24 1137   Strain of left shoulder, initial encounter   Strain of tendon of left half of anterior chest wall   Arthritis, shoulder region       Medical Decision Making  X-rays show arthritis/degenerative changes to the left shoulder x-rays of the chest/ribs negative for acute pathology    Will treat as a muscle strain and arthritis    Prescribed Mobic and a muscle relaxant and a work note given    XR shoulder left 2+ views   Final Result    Mild to moderate degenerative joint disease of the left shoulder.    Signed by Mily Dockery DO     XR ribs 2 views left w chest pa or ap   Final Result    No acute osseous abnormality.    Signed by Yoseph Martinez MD         Did review patient's lab work that was done in September  2023        Procedure  Procedures     Bettye Harris PA-C  04/08/24 1112       Bettye Harris PA-C  04/08/24 1135

## 2024-04-08 NOTE — ED TRIAGE NOTES
Pt states that she has pain with movement of the left arm, on Saturday she was reaching into the backseat of her SUV and felt and heard a popping to the left shoulder.

## 2024-04-08 NOTE — Clinical Note
Marina Lopez was seen and treated in our emergency department on 4/8/2024.  She may return to work on 04/13/2024.       If you have any questions or concerns, please don't hesitate to call.      Derek Fair, DO

## 2024-04-11 DIAGNOSIS — M25.512 LEFT SHOULDER PAIN, UNSPECIFIED CHRONICITY: ICD-10-CM

## 2024-04-17 ENCOUNTER — HOSPITAL ENCOUNTER (OUTPATIENT)
Dept: RADIOLOGY | Facility: HOSPITAL | Age: 69
Discharge: HOME | End: 2024-04-17
Payer: MEDICARE

## 2024-04-17 ENCOUNTER — OFFICE VISIT (OUTPATIENT)
Dept: ORTHOPEDIC SURGERY | Facility: CLINIC | Age: 69
End: 2024-04-17
Payer: MEDICARE

## 2024-04-17 DIAGNOSIS — M75.102 TEAR OF LEFT ROTATOR CUFF, UNSPECIFIED TEAR EXTENT, UNSPECIFIED WHETHER TRAUMATIC: ICD-10-CM

## 2024-04-17 DIAGNOSIS — S22.32XA CLOSED TRAUMATIC NONDISPLACED FRACTURE OF ONE RIB OF LEFT SIDE, INITIAL ENCOUNTER: ICD-10-CM

## 2024-04-17 DIAGNOSIS — M75.22 BICEPS TENDINITIS OF LEFT SHOULDER: ICD-10-CM

## 2024-04-17 DIAGNOSIS — M25.512 LEFT SHOULDER PAIN, UNSPECIFIED CHRONICITY: ICD-10-CM

## 2024-04-17 DIAGNOSIS — M75.122 COMPLETE TEAR OF LEFT ROTATOR CUFF, UNSPECIFIED WHETHER TRAUMATIC: Primary | ICD-10-CM

## 2024-04-17 DIAGNOSIS — M75.42 IMPINGEMENT SYNDROME OF LEFT SHOULDER: ICD-10-CM

## 2024-04-17 DIAGNOSIS — S43.432A LABRAL TEAR OF SHOULDER, LEFT, INITIAL ENCOUNTER: ICD-10-CM

## 2024-04-17 PROCEDURE — 73020 X-RAY EXAM OF SHOULDER: CPT | Mod: LEFT SIDE | Performed by: RADIOLOGY

## 2024-04-17 PROCEDURE — 99204 OFFICE O/P NEW MOD 45 MIN: CPT | Performed by: ORTHOPAEDIC SURGERY

## 2024-04-17 PROCEDURE — 4010F ACE/ARB THERAPY RXD/TAKEN: CPT | Performed by: ORTHOPAEDIC SURGERY

## 2024-04-17 PROCEDURE — 1159F MED LIST DOCD IN RCRD: CPT | Performed by: ORTHOPAEDIC SURGERY

## 2024-04-17 PROCEDURE — 20610 DRAIN/INJ JOINT/BURSA W/O US: CPT | Performed by: ORTHOPAEDIC SURGERY

## 2024-04-17 PROCEDURE — 1125F AMNT PAIN NOTED PAIN PRSNT: CPT | Performed by: ORTHOPAEDIC SURGERY

## 2024-04-17 PROCEDURE — 73020 X-RAY EXAM OF SHOULDER: CPT | Mod: LT

## 2024-04-17 PROCEDURE — 1036F TOBACCO NON-USER: CPT | Performed by: ORTHOPAEDIC SURGERY

## 2024-04-17 PROCEDURE — 3008F BODY MASS INDEX DOCD: CPT | Performed by: ORTHOPAEDIC SURGERY

## 2024-04-17 PROCEDURE — 1160F RVW MEDS BY RX/DR IN RCRD: CPT | Performed by: ORTHOPAEDIC SURGERY

## 2024-04-17 RX ORDER — TRIAMCINOLONE ACETONIDE 40 MG/ML
40 INJECTION, SUSPENSION INTRA-ARTICULAR; INTRAMUSCULAR
Status: COMPLETED | OUTPATIENT
Start: 2024-04-17 | End: 2024-04-17

## 2024-04-17 RX ORDER — LIDOCAINE HYDROCHLORIDE 5 MG/ML
4 INJECTION, SOLUTION INFILTRATION; PERINEURAL
Status: COMPLETED | OUTPATIENT
Start: 2024-04-17 | End: 2024-04-17

## 2024-04-17 RX ORDER — MELOXICAM 15 MG/1
15 TABLET ORAL DAILY PRN
Qty: 30 TABLET | Refills: 11 | Status: SHIPPED | OUTPATIENT
Start: 2024-04-17 | End: 2025-04-17

## 2024-04-17 RX ADMIN — TRIAMCINOLONE ACETONIDE 40 MG: 40 INJECTION, SUSPENSION INTRA-ARTICULAR; INTRAMUSCULAR at 13:25

## 2024-04-17 RX ADMIN — LIDOCAINE HYDROCHLORIDE 4 ML: 5 INJECTION, SOLUTION INFILTRATION; PERINEURAL at 13:25

## 2024-04-17 ASSESSMENT — PAIN - FUNCTIONAL ASSESSMENT: PAIN_FUNCTIONAL_ASSESSMENT: 0-10

## 2024-04-17 ASSESSMENT — PAIN SCALES - GENERAL: PAINLEVEL_OUTOF10: 4

## 2024-04-17 NOTE — PROGRESS NOTES
68-year-old female presents to see me with 2 weeks of left shoulder and rib pain.  Patient stated she was reaching behind her in her car seat and felt a and heard a pop under her arm.  Since then she has had pain in the left shoulder and also pain over left-sided ribs when she is taking a deep breath.  She has been taking anti-inflammatories.  She states lifting the arm makes the pain worse    Patients' self reported past medical history, medications, allergies, surgical history, family and social history as well as a 10 point review of systems has been documented in the new patient intake form and scanned into the patient's electronic medical record.  The intake form was reviewed by Dr Sanchez during the office visit and signed by Dr. Sanchez and the patient.  Pertinent findings are documented in the HPI.    General Multi-System Physical Exam:  Constitutional  General appearance:  Alert, oriented, and in no acute distress.  Well developed, well nourished.  Head and Face  Head and face:  Normocephalic and atraumatic.  Ears, Nose, Mouth, and Throat  External inspection of ears and nose: Normal.  Eyes:  Pupils are equal and round.  Neck  Neck:  no neck mass was observed.  Pulmonary  Respiratory effort:  no respiratory distress.  Cardiovascular  Intact distal pulses.  Lymphatic  Palpation of lymph nodes in the affected extremity:  Normal.  Skin  Skin and subcutaneous tissue:  Normal skin color and pigmentation.  Normal skin turgor.  No rashes.  Neurologic  Sensation:  normal to light touch.  Psychiatric  Judgement and insight:  Intact.  Mood and affect:  Normal.  Musculoskeletal  Patient is tender over her mid ribs underneath her arm on the left side.  Left shoulder is 135 degrees of abduction forward flexion 45 degrees of external rotation internal rotates to T8 she has positive biceps tenderness negative acromioclavicular joint tenderness positive Neer positive Ji positive Jobes with pain and weakness  "neurovascular tact left upper extremity    X-rays of the patient were ordered by Dr Sanchez and obtained today.  Dr Sanchez personally reviewed the results of the x-rays.    In addition, Dr Sanchez independently interpreted the patient's x-rays (performed by the Radiology department) by viewing the x-ray images and this is Dr. Sanchez's personal interpretation:     Left shoulder with no significant arthritis no fracture seen    I discussed with the patient that she could possibly have some rib fractures in the left side of her chest.  Orthopedics does not take care of rib fractures.  I recommended her to talk to her primary care doctor about this and possibly be referred to a thoracic surgeon if there is concern for this since orthopedics does not care for rib fractures.  I explained to her that rib fractures generally heal nonoperatively however I am not a rib fracture specialist and would recommend her see a rib fracture specialist if she has problems with her ribs    In regards to her shoulder we will start off by try to treat her conservatively.    We had a long discussion in regards to the patient's shoulder pain.  The differential diagnosis of the patient's shoulder pain include: shoulder impingement, rotator cuff tendinopathy, rotator cuff tearing, and biceps tendinopathy as all being potential sources of the pain.  There are numerous non-operative and operative treatment options for each of these conditions.     We will start off by treating the patient's shoulder conservatively (AKA non-operatively).  We gave the patient a prescription for physical therapy to work on increasing the range of motion and strength in the shoulder.   We also gave the patient a \"home exercise protocol\" list of exercises that they could work on to strengthen their shoulder at home.  We also called in a prescription for anti-inflammatories for the patient.  The patient was informed that there are rare risks of using nonsteroidal " antiinflammatory (NSAID) medications.  Risks of NSAIDS include, but are not limited to, upset stomach, ulcers in the stomach and other places in the gastrointestinal tract, and a mild increase in cardiovascular risk as a result of the antiinflammatory medications.  In addition, there is an increased risk in bleeding as a result of the medications.  The patient was advised to stop taking the NSAIDs if they cause them to have an upset stomach.  The patient was instructed to take the medication on a p.r.n. basis as needed only.  NSAIDs are not supposed to be taken every day for more than a few weeks.  If they have any questions or problems with the antiinflammatory medications, they should stop taking the medication immediately and call the office.    We offered the patient an injection of steroids into their shoulder.  I explained to the patient that there are some rare risks of steroid injections.      Rare risks of steroid injections into the shoulder include pain at the site of the injection, local swelling, irritation from the injection or the skin spray, local discoloration of the skin, risk of bleeding, and a risk of shoulder infection.  If the patient does have a flareup of pain in the evening following the injection, they should ice the shoulder 15 minutes at a time 3 times a day for up to 3 days.  If the pain gets too severe, they should go to a local Emergency Room right away.      The patient decided to proceed with the injection.  After sterilely prepping the posterior aspect of the shoulder joint with Betadyne, 5 mL of 0.25% Marcaine and 1 mL of Kenalog 40 mg were injected into the subacromial bursa from a posterior approach.  There were no complications.  A bandage was applied over the site of the injection. The patient tolerated the procedure well.    We will see the patient back in 6-8 weeks to reevaluate their shoulder pain. If they are still having pain at that time, we would then need to order a MRI  to look for possible rotator cuff tears or biceps tearing in the shoulder.  The patient should call the office during business hours (9am-3pm; Monday - Friday)  with any questions or problems.  If the patient has any urgent issues outside of business hours, they should go to a local Emergency Room.    We gave her steroid injection into the left shoulder as well as a prescription for meloxicam and physical therapy.        This patient has a new, acute, previously-undiagnosed problem of their affected extremity.  We will begin treatment as listed here and monitor treatment based upon their progression and response to treatment.  Due to the fact that we are just beginning treatment on this issue, this is currently considered an undiagnosed new problem with uncertain prognosis.  The exact diagnosis and their prognosis will depend upon their response to treatment and progression of their condition as time progresses.    Due to this patient's condition, they are at a moderate risk of morbidity from additional diagnostic testing / treatment.      To help them with their pain, I wrote them a prescription for prescription strength anti-inflammatories.  The patient was informed that there are risks of using nonsteroidal antiinflammatory (NSAID) medications.    Risks of NSAIDS include, but are not limited to, upset stomach, ulcers in the stomach and other places in the gastrointestinal tract, and a mild increase in cardiovascular risk as a result of the antiinflammatory medications.  In addition, there is an increased risk in bleeding as a result of the medications.    The patient was advised to stop taking the NSAIDs if they cause them to have an upset stomach.  NSAIDs are not supposed to be taken every day for more than a few weeks.  If they have any questions or problems with the antiinflammatory medications, they should stop taking the medication immediately and call the office.    Patient ID: Marina Lopez is a 68 y.o.  female.    L Inj/Asp: L subacromial bursa on 4/17/2024 1:25 PM  Indications: pain  Details: 22 G needle, posterior approach  Medications: 40 mg triamcinolone acetonide 40 mg/mL; 4 mL lidocaine 5 mg/mL (0.5 %)  Outcome: tolerated well, no immediate complications  Procedure, treatment alternatives, risks and benefits explained, specific risks discussed. Consent was given by the patient. Immediately prior to procedure a time out was called to verify the correct patient, procedure, equipment, support staff and site/side marked as required. Patient was prepped and draped in the usual sterile fashion.

## 2024-04-29 ENCOUNTER — EVALUATION (OUTPATIENT)
Dept: PHYSICAL THERAPY | Facility: HOSPITAL | Age: 69
End: 2024-04-29
Payer: MEDICARE

## 2024-04-29 DIAGNOSIS — M75.102 TEAR OF LEFT ROTATOR CUFF, UNSPECIFIED TEAR EXTENT, UNSPECIFIED WHETHER TRAUMATIC: Primary | ICD-10-CM

## 2024-04-29 PROCEDURE — 97161 PT EVAL LOW COMPLEX 20 MIN: CPT | Mod: GP | Performed by: PHYSICAL THERAPIST

## 2024-04-29 ASSESSMENT — PAIN SCALES - GENERAL: PAINLEVEL_OUTOF10: 0 - NO PAIN

## 2024-04-29 ASSESSMENT — ENCOUNTER SYMPTOMS
LOSS OF SENSATION IN FEET: 0
DEPRESSION: 0
OCCASIONAL FEELINGS OF UNSTEADINESS: 1

## 2024-04-29 ASSESSMENT — PAIN - FUNCTIONAL ASSESSMENT: PAIN_FUNCTIONAL_ASSESSMENT: 0-10

## 2024-04-29 NOTE — PROGRESS NOTES
Physical Therapy  Physical Therapy Evaluation    Patient Name: Marina Lopez  MRN: [id number]  Today's Date: 4/29/2024  Time Calculation  Start Time: 0848  Stop Time: 0916  Time Calculation (min): 28 min    PT Evaluation Time Entry  PT Evaluation (Low) Time Entry: 23  PT Therapeutic Procedures Time Entry  Therapeutic Exercise Time Entry: 5                   Insurance:  Visit number: 1 of 5  Authorization info: no auth required  Payor: T MEDICARE / Plan: AET MEDICARE VALUE PLAN / Product Type: *No Product type* /     Current Problem  1. Tear of left rotator cuff, unspecified tear extent, unspecified whether traumatic  Referral to Physical Therapy    Follow Up In Physical Therapy        Problem List Items Addressed This Visit             ICD-10-CM    Tear of left rotator cuff - Primary M75.102    Relevant Orders    Follow Up In Physical Therapy       General:  General  Reason for Referral: left shoulder pain  Referred By: Dr. Sanchez    Precautions:   Precautions  STEADI Fall Risk Score (The score of 4 or more indicates an increased risk of falling): 3  Medical Precautions: No known precautions/limitation    Medical History Form: Reviewed (scanned into chart)    Subjective:   Subjective   Chief Complaint: Patient is a 68 year old female who presents to clinic with complaints of left shoulder pain. Patient states that she was reaching across her body with her right upper extremity and felt/heard a pop in her left upper extremity and under her left upper extremity. X rays were negative for fracture.  Onset Date: 4/17/2024  ERIN: Insidious    Current Condition:   Better    Pain:  Pain Assessment: 0-10  Pain Score: 0 - No pain (Up to 5/10 when reaching back or overhead)  Aggravating Factors:  Reaching Overhead, Reaching Behind Back, and Carrying  Relieving Factors:  Rest    Relevant Information (PMH & Previous Tests/Imaging): x rays were negative for fractures  Previous Interventions/Treatments: None    Prior Level of  Function (PLOF)  Patient previously independent with all ADLs  Exercise/Physical Activity: play with grandkids, walk her dogs  Work/School: retired  Hobbies: read    Hand dominance: right     Patients Living Environment: Reviewed and no concern  Home Living  Home Living Comment: Patient lives with her . Patient has no mobility concerns.  Prior Function Per Pt/Caregiver Report  Level of [place]: Independent with ADLs and functional transfers, Independent with homemaking with ambulation    Primary Language: English    Patient's Goal(s) for Therapy: decrease pain, improve left upper extremity strength    Red Flags: Do you have any of the following? No  Fever/chills, unexplained weight changes, dizziness/fainting, unexplained change in bowel or bladder functions, unexplained malaise or muscle weakness, night pain/sweats, numbness or tingling    Objective:  Objective     Shoulder    Functional Rating Scale  Quick Dash: 77.5    Shoulder AROM  Shoulder AROM WFL: yes (pain with abduction)  R Shoulder flexion: (180°): 160  L Shoulder flexion: (180°): 140  R shoulder abduction: (180°): 160  L Shoulder abduction: (180°): 140  R Shoulder ER: (90°): not tested  L shoulder ER: (90°): 40    Shoulder Strength  R shoulder flexion: (5/5): 4+/5  L shoulder flexion: (5/5): 4+/5  R shoulder abduction: (5/5): 4+/5  L shoulder abduction: (5/5): 4/5  R shoulder ER: (5/5): 4+/5  L shoulder ER: (5/5): 4+/5  R shoulder IR: (5/5) : 4+5  L shoulder IR: (5/5): 4+/5    Elbow Strength  R elbow flexion: (5/5): 5/5  L elbow flexion: (5/5): 5/5  R elbow extension: (5/5): 4+/5  L elbow extension: (5/5): 4+/5    Posture: shoulder rounded forward, head forward, and left shoulder level is slightly higher    Palpation: point tenderness to left sided thoracic paraspinals       Special Tests    RTC/Impingement   Neer Impingement: + left upper extremity    Ji Yair: + left upper extremity    Empty Can: -   Lift Off Test: -    AC  Joint   Cross Arm Test: -    Biceps   Speeds Test: -    Instability   Sulcus Test: -    Outcome Measures:  Other Measures  Disability of Arm Shoulder Hand (DASH): 77.5     Treatment Performed:  Therapeutic Exercise  Therapeutic Exercise Performed: Yes  Therapeutic Exercise Activity 1: chin tucks x5  Therapeutic Exercise Activity 2: s/l thoracic rotation x5  Therapeutic Exercise Activity 3: seated thoracic extension x5    EDUCATION:   Individual(s) Educated: patient   Education Provided: Home exercise program, plan of care, activity modifications, pain management, and injury pathology  Handout(s) Provided: Scanned into chart  Home Program: Access Code: [email or web address]  Risk and Benefits Discussed with Patient/Caregiver/Other: Yes   Patient/Caregiver Demonstrated Understanding: Yes   Plan of Care Discussed and Agreed Upon: Yes   Patient Response to Education: Patient/Caregiver verbalized understanding of information and Patient/Caregiver performed return demonstration of exercises/activities    Assessment: Patient presents with left sided pain. Patient demonstrated slightly impaired left upper extremity strength and range of motion resulting in limited participation in pain-free ADLs and inability to perform at their prior level of function. Most of patient's complaints of pain is located in her thoracic region, not her shoulder. Skilled PT warranted to address the above stated impairments, so the patient can perform FA's without increased pain or difficulty.    PT Assessment Results: Decreased strength, Decreased range of motion, Pain, Obesity  Rehab Prognosis: Good  Evaluation/Treatment Tolerance: Patient limited by pain, Patient tolerated treatment well    Stable and/or uncomplicated characteristics    Plan:  Treatment/Interventions: Education/ Instruction, Manual therapy, Therapeutic activities, Therapeutic exercises  PT Plan: Skilled PT  PT Frequency: 1 time per week  Duration: 5 weeks  Onset Date:  04/17/24  Certification Period Start Date: 04/29/24  Certification Period End Date: 06/03/24  Number of Treatments Authorized: 1 of 5  Rehab Potential: Good  Plan of Care Agreement: Patient    Goals: Set and discussed today  Active       PT Problem       Patient will achieve bilateral shoulder flexion of 160 degrees       Start:  04/29/24    Expected End:  06/03/24            Patient will achieve bilateral shoulder abduction of 160 degrees       Start:  04/29/24    Expected End:  06/03/24            Patient will achieve bilateral shoulder external rotation of at least 70 degrees in 45 degrees abd       Start:  04/29/24    Expected End:  06/03/24            Patient will demonstrate independence in home program for support of progression       Start:  04/29/24    Expected End:  05/13/24            Patient will report pain of no more than 2/10 demonstrating a reduction of overall pain       Start:  04/29/24    Expected End:  06/03/24            Patient will show a significant change in Quick Dash (77.5 to 69.5) patient reported outcome tool to demonstrate subjective imporovement       Start:  04/29/24    Expected End:  06/03/24                Plan of care was developed with input and agreement by the patient    Eugenio Agnel, PT

## 2024-05-06 ENCOUNTER — APPOINTMENT (OUTPATIENT)
Dept: PHYSICAL THERAPY | Facility: HOSPITAL | Age: 69
End: 2024-05-06
Payer: MEDICARE

## 2024-05-13 ENCOUNTER — TREATMENT (OUTPATIENT)
Dept: PHYSICAL THERAPY | Facility: HOSPITAL | Age: 69
End: 2024-05-13
Payer: MEDICARE

## 2024-05-13 DIAGNOSIS — M75.102 TEAR OF LEFT ROTATOR CUFF, UNSPECIFIED TEAR EXTENT, UNSPECIFIED WHETHER TRAUMATIC: ICD-10-CM

## 2024-05-13 PROCEDURE — 97110 THERAPEUTIC EXERCISES: CPT | Mod: GP,CQ

## 2024-05-13 ASSESSMENT — PAIN - FUNCTIONAL ASSESSMENT: PAIN_FUNCTIONAL_ASSESSMENT: 0-10

## 2024-05-13 ASSESSMENT — PAIN SCALES - GENERAL: PAINLEVEL_OUTOF10: 0 - NO PAIN

## 2024-05-13 NOTE — PROGRESS NOTES
"  Physical Therapy Treatment    Patient Name: Marina Lopez  MRN: [id number]  Today's Date: 5/13/2024                            Current Problem  1. Tear of left rotator cuff, unspecified tear extent, unspecified whether traumatic  Follow Up In Physical Therapy          General  Reason for Referral: left shoulder pain  Referred By: Dr. Sanchez    Subjective   Current Condition:   Same  Patient reports she lifted and carried grocery bag with 1/2 gallon of milk, which caused some pain, otherwise hasn't had any episodes.     Performing HEP?: partial    Precautions     Pain  Pain Assessment: 0-10  Pain Score: 0 - No pain    Objective   Shoulder    Shoulder AROM: L ER 79*     Shoulder PROM       Treatments:    Therapeutic Exercise  Therapeutic Exercise Performed: Yes  Therapeutic Exercise Activity 1: AAROM Shoulder flexion x15  Therapeutic Exercise Activity 2: AAROM Shoulder ER x15  Therapeutic Exercise Activity 3: open book x15  Therapeutic Exercise Activity 4: R lateral trunk flexion x3 15\"  Therapeutic Exercise Activity 5: Pec stretch doorway x3 15\"  Therapeutic Exercise Activity 6: Seated thoracic extension over chair x10  Therapeutic Exercise Activity 7: Seated trunk rotation x10 R/L  Therapeutic Exercise Activity 8: scapular retraction Blue x20  Therapeutic Exercise Activity 9: Shoulder Adduction Blue x20  Therapeutic Exercise Activity 10: Mid Rows Blue x20                             EDUCATION:   Individual(s) Educated: Patient  Education Provided:   Risk and Benefits Discussed with Patient/Caregiver/Other: Yes   Patient/Caregiver Demonstrated Understanding: Yes   Patient Response to Education: Patient/Caregiver verbalized understanding of information    Assessment: Unable to reproduce pt's symptoms with exercises performed today.  Pt able to perform all shoulder and trunk movements without c/o pain.   PT Assessment  PT Assessment Results: Decreased strength, Decreased range of motion, Pain, Obesity  Rehab Prognosis: " Good    Plan: Continue to progress current POC as tolerated to facilitate ability to perform functional activities.   OP PT Plan  PT Plan: Skilled PT  PT Frequency: 1 time per week  Duration: 5 weeks  Onset Date: 04/17/24  Certification Period Start Date: 04/29/24  Certification Period End Date: 06/03/24  Number of Treatments Authorized: Visit 2 of 5    Goals:  Active       PT Problem       Patient will achieve bilateral shoulder flexion of 160 degrees       Start:  04/29/24    Expected End:  06/03/24            Patient will achieve bilateral shoulder abduction of 160 degrees       Start:  04/29/24    Expected End:  06/03/24            Patient will achieve bilateral shoulder external rotation of at least 70 degrees in 45 degrees abd       Start:  04/29/24    Expected End:  06/03/24            Patient will demonstrate independence in home program for support of progression       Start:  04/29/24    Expected End:  05/13/24            Patient will report pain of no more than 2/10 demonstrating a reduction of overall pain       Start:  04/29/24    Expected End:  06/03/24            Patient will show a significant change in Quick Dash (77.5 to 69.5) patient reported outcome tool to demonstrate subjective imporovement       Start:  04/29/24    Expected End:  06/03/24                 Elder Solis, PTA

## 2024-05-20 ENCOUNTER — APPOINTMENT (OUTPATIENT)
Dept: PHYSICAL THERAPY | Facility: HOSPITAL | Age: 69
End: 2024-05-20
Payer: MEDICARE

## 2024-05-22 ENCOUNTER — APPOINTMENT (OUTPATIENT)
Dept: PREADMISSION TESTING | Facility: HOSPITAL | Age: 69
End: 2024-05-22
Payer: MEDICARE

## 2024-05-28 ENCOUNTER — DOCUMENTATION (OUTPATIENT)
Dept: PHYSICAL THERAPY | Facility: HOSPITAL | Age: 69
End: 2024-05-28
Payer: MEDICARE

## 2024-05-28 NOTE — PROGRESS NOTES
Physical Therapy                 Therapy Communication Note    Patient Name: Marina Lopez  MRN: [id number]  Today's Date: 5/28/2024     Discipline: Physical Therapy    Missed Visit Reason:      Missed Time: No Showed for today's appointment at 3:15 pm.    Comment:

## 2024-05-30 DIAGNOSIS — E11.65 TYPE 2 DIABETES MELLITUS WITH HYPERGLYCEMIA, WITHOUT LONG-TERM CURRENT USE OF INSULIN (MULTI): ICD-10-CM

## 2024-05-30 DIAGNOSIS — I10 ESSENTIAL HYPERTENSION: ICD-10-CM

## 2024-05-30 RX ORDER — HYDROCHLOROTHIAZIDE 25 MG/1
25 TABLET ORAL DAILY
Qty: 30 TABLET | Refills: 0 | Status: SHIPPED | OUTPATIENT
Start: 2024-05-30 | End: 2024-06-10 | Stop reason: SDUPTHER

## 2024-05-30 RX ORDER — PIOGLITAZONEHYDROCHLORIDE 30 MG/1
30 TABLET ORAL DAILY
Qty: 30 TABLET | Refills: 0 | Status: SHIPPED | OUTPATIENT
Start: 2024-05-30 | End: 2024-06-10 | Stop reason: SDUPTHER

## 2024-05-30 RX ORDER — AMLODIPINE BESYLATE 5 MG/1
TABLET ORAL
Qty: 30 TABLET | Refills: 0 | Status: SHIPPED | OUTPATIENT
Start: 2024-05-30

## 2024-05-30 RX ORDER — EZETIMIBE 10 MG/1
10 TABLET ORAL DAILY
Qty: 30 TABLET | Refills: 0 | Status: SHIPPED | OUTPATIENT
Start: 2024-05-30 | End: 2024-06-10 | Stop reason: SDUPTHER

## 2024-05-30 RX ORDER — DULAGLUTIDE 3 MG/.5ML
INJECTION, SOLUTION SUBCUTANEOUS
Qty: 2 ML | Refills: 0 | Status: SHIPPED | OUTPATIENT
Start: 2024-05-30 | End: 2024-06-10 | Stop reason: SDUPTHER

## 2024-05-30 RX ORDER — LOSARTAN POTASSIUM 100 MG/1
100 TABLET ORAL DAILY
Qty: 30 TABLET | Refills: 0 | Status: SHIPPED | OUTPATIENT
Start: 2024-05-30 | End: 2024-06-10 | Stop reason: SDUPTHER

## 2024-06-10 ENCOUNTER — LAB (OUTPATIENT)
Dept: LAB | Facility: LAB | Age: 69
End: 2024-06-10
Payer: MEDICARE

## 2024-06-10 ENCOUNTER — OFFICE VISIT (OUTPATIENT)
Dept: PRIMARY CARE | Facility: CLINIC | Age: 69
End: 2024-06-10
Payer: MEDICARE

## 2024-06-10 VITALS
WEIGHT: 190.8 LBS | OXYGEN SATURATION: 99 % | SYSTOLIC BLOOD PRESSURE: 140 MMHG | BODY MASS INDEX: 33.8 KG/M2 | DIASTOLIC BLOOD PRESSURE: 80 MMHG | HEART RATE: 67 BPM | TEMPERATURE: 97.2 F

## 2024-06-10 DIAGNOSIS — E11.65 TYPE 2 DIABETES MELLITUS WITH HYPERGLYCEMIA, WITHOUT LONG-TERM CURRENT USE OF INSULIN (MULTI): Primary | ICD-10-CM

## 2024-06-10 DIAGNOSIS — R32 URINARY INCONTINENCE, UNSPECIFIED TYPE: ICD-10-CM

## 2024-06-10 DIAGNOSIS — I10 ESSENTIAL HYPERTENSION: ICD-10-CM

## 2024-06-10 DIAGNOSIS — E55.9 VITAMIN D DEFICIENCY: ICD-10-CM

## 2024-06-10 DIAGNOSIS — E11.65 TYPE 2 DIABETES MELLITUS WITH HYPERGLYCEMIA, WITHOUT LONG-TERM CURRENT USE OF INSULIN (MULTI): ICD-10-CM

## 2024-06-10 DIAGNOSIS — E78.2 MIXED HYPERLIPIDEMIA: ICD-10-CM

## 2024-06-10 DIAGNOSIS — E66.9 CLASS 1 OBESITY WITH SERIOUS COMORBIDITY AND BODY MASS INDEX (BMI) OF 33.0 TO 33.9 IN ADULT, UNSPECIFIED OBESITY TYPE: ICD-10-CM

## 2024-06-10 DIAGNOSIS — E23.7 ABNORMALITY OF PITUITARY GLAND (MULTI): ICD-10-CM

## 2024-06-10 DIAGNOSIS — L98.9 SKIN LESIONS: ICD-10-CM

## 2024-06-10 LAB
25(OH)D3 SERPL-MCNC: 43 NG/ML (ref 30–100)
ALBUMIN SERPL BCP-MCNC: 4.1 G/DL (ref 3.4–5)
ALP SERPL-CCNC: 57 U/L (ref 33–136)
ALT SERPL W P-5'-P-CCNC: 12 U/L (ref 7–45)
ANION GAP SERPL CALC-SCNC: 9 MMOL/L (ref 10–20)
AST SERPL W P-5'-P-CCNC: 16 U/L (ref 9–39)
BASOPHILS # BLD AUTO: 0.05 X10*3/UL (ref 0–0.1)
BASOPHILS NFR BLD AUTO: 0.6 %
BILIRUB SERPL-MCNC: 0.5 MG/DL (ref 0–1.2)
BUN SERPL-MCNC: 23 MG/DL (ref 6–23)
CALCIUM SERPL-MCNC: 10 MG/DL (ref 8.6–10.3)
CHLORIDE SERPL-SCNC: 105 MMOL/L (ref 98–107)
CHOLEST SERPL-MCNC: 164 MG/DL (ref 0–199)
CHOLESTEROL/HDL RATIO: 4.4
CO2 SERPL-SCNC: 30 MMOL/L (ref 21–32)
CREAT SERPL-MCNC: 0.93 MG/DL (ref 0.5–1.05)
EGFRCR SERPLBLD CKD-EPI 2021: 67 ML/MIN/1.73M*2
EOSINOPHIL # BLD AUTO: 0.16 X10*3/UL (ref 0–0.7)
EOSINOPHIL NFR BLD AUTO: 1.9 %
ERYTHROCYTE [DISTWIDTH] IN BLOOD BY AUTOMATED COUNT: 13 % (ref 11.5–14.5)
GLUCOSE SERPL-MCNC: 108 MG/DL (ref 74–99)
HCT VFR BLD AUTO: 39.9 % (ref 36–46)
HDLC SERPL-MCNC: 37 MG/DL
HGB BLD-MCNC: 13 G/DL (ref 12–16)
IMM GRANULOCYTES # BLD AUTO: 0.02 X10*3/UL (ref 0–0.7)
IMM GRANULOCYTES NFR BLD AUTO: 0.2 % (ref 0–0.9)
LDLC SERPL CALC-MCNC: 104 MG/DL
LYMPHOCYTES # BLD AUTO: 2.38 X10*3/UL (ref 1.2–4.8)
LYMPHOCYTES NFR BLD AUTO: 28.5 %
MCH RBC QN AUTO: 32 PG (ref 26–34)
MCHC RBC AUTO-ENTMCNC: 32.6 G/DL (ref 32–36)
MCV RBC AUTO: 98 FL (ref 80–100)
MONOCYTES # BLD AUTO: 0.64 X10*3/UL (ref 0.1–1)
MONOCYTES NFR BLD AUTO: 7.7 %
NEUTROPHILS # BLD AUTO: 5.11 X10*3/UL (ref 1.2–7.7)
NEUTROPHILS NFR BLD AUTO: 61.1 %
NON HDL CHOLESTEROL: 127 MG/DL (ref 0–149)
NRBC BLD-RTO: 0 /100 WBCS (ref 0–0)
PLATELET # BLD AUTO: 146 X10*3/UL (ref 150–450)
POC FINGERSTICK BLOOD GLUCOSE: 108 MG/DL (ref 70–100)
POC HEMOGLOBIN A1C: 5.9 % (ref 4.2–6.5)
POTASSIUM SERPL-SCNC: 4.7 MMOL/L (ref 3.5–5.3)
PROT SERPL-MCNC: 7.3 G/DL (ref 6.4–8.2)
RBC # BLD AUTO: 4.06 X10*6/UL (ref 4–5.2)
SODIUM SERPL-SCNC: 139 MMOL/L (ref 136–145)
TRIGL SERPL-MCNC: 114 MG/DL (ref 0–149)
TSH SERPL-ACNC: 1.12 MIU/L (ref 0.44–3.98)
VIT B12 SERPL-MCNC: >2000 PG/ML (ref 211–911)
VLDL: 23 MG/DL (ref 0–40)
WBC # BLD AUTO: 8.4 X10*3/UL (ref 4.4–11.3)

## 2024-06-10 PROCEDURE — 36415 COLL VENOUS BLD VENIPUNCTURE: CPT

## 2024-06-10 PROCEDURE — 99214 OFFICE O/P EST MOD 30 MIN: CPT | Performed by: NURSE PRACTITIONER

## 2024-06-10 PROCEDURE — 82306 VITAMIN D 25 HYDROXY: CPT

## 2024-06-10 PROCEDURE — 82962 GLUCOSE BLOOD TEST: CPT | Performed by: NURSE PRACTITIONER

## 2024-06-10 PROCEDURE — 1160F RVW MEDS BY RX/DR IN RCRD: CPT | Performed by: NURSE PRACTITIONER

## 2024-06-10 PROCEDURE — 3049F LDL-C 100-129 MG/DL: CPT | Performed by: NURSE PRACTITIONER

## 2024-06-10 PROCEDURE — 1159F MED LIST DOCD IN RCRD: CPT | Performed by: NURSE PRACTITIONER

## 2024-06-10 PROCEDURE — 4010F ACE/ARB THERAPY RXD/TAKEN: CPT | Performed by: NURSE PRACTITIONER

## 2024-06-10 PROCEDURE — 82607 VITAMIN B-12: CPT

## 2024-06-10 PROCEDURE — 3079F DIAST BP 80-89 MM HG: CPT | Performed by: NURSE PRACTITIONER

## 2024-06-10 PROCEDURE — 3077F SYST BP >= 140 MM HG: CPT | Performed by: NURSE PRACTITIONER

## 2024-06-10 PROCEDURE — 1036F TOBACCO NON-USER: CPT | Performed by: NURSE PRACTITIONER

## 2024-06-10 PROCEDURE — 82570 ASSAY OF URINE CREATININE: CPT

## 2024-06-10 PROCEDURE — 82043 UR ALBUMIN QUANTITATIVE: CPT

## 2024-06-10 PROCEDURE — 83036 HEMOGLOBIN GLYCOSYLATED A1C: CPT | Performed by: NURSE PRACTITIONER

## 2024-06-10 PROCEDURE — 3008F BODY MASS INDEX DOCD: CPT | Performed by: NURSE PRACTITIONER

## 2024-06-10 RX ORDER — EZETIMIBE 10 MG/1
10 TABLET ORAL DAILY
Qty: 90 TABLET | Refills: 0 | Status: SHIPPED | OUTPATIENT
Start: 2024-06-10

## 2024-06-10 RX ORDER — DULAGLUTIDE 3 MG/.5ML
INJECTION, SOLUTION SUBCUTANEOUS
Qty: 6 ML | Refills: 0 | Status: SHIPPED | OUTPATIENT
Start: 2024-06-10

## 2024-06-10 RX ORDER — PIOGLITAZONEHYDROCHLORIDE 30 MG/1
30 TABLET ORAL DAILY
Qty: 90 TABLET | Refills: 0 | Status: SHIPPED | OUTPATIENT
Start: 2024-06-10

## 2024-06-10 RX ORDER — HYDROCHLOROTHIAZIDE 25 MG/1
25 TABLET ORAL DAILY
Qty: 90 TABLET | Refills: 0 | Status: SHIPPED | OUTPATIENT
Start: 2024-06-10

## 2024-06-10 RX ORDER — KETOROLAC TROMETHAMINE 5 MG/ML
1 SOLUTION OPHTHALMIC 4 TIMES DAILY
COMMUNITY
Start: 2024-06-07

## 2024-06-10 RX ORDER — LOSARTAN POTASSIUM 100 MG/1
100 TABLET ORAL DAILY
Qty: 90 TABLET | Refills: 0 | Status: SHIPPED | OUTPATIENT
Start: 2024-06-10

## 2024-06-10 RX ORDER — PREDNISOLONE ACETATE 10 MG/ML
1 SUSPENSION/ DROPS OPHTHALMIC 4 TIMES DAILY
COMMUNITY
Start: 2024-06-07

## 2024-06-10 RX ORDER — CARVEDILOL 3.12 MG/1
3.12 TABLET ORAL
Qty: 60 TABLET | Refills: 2 | Status: SHIPPED | OUTPATIENT
Start: 2024-06-10

## 2024-06-10 RX ORDER — AMLODIPINE BESYLATE 5 MG/1
TABLET ORAL
Qty: 90 TABLET | Refills: 0 | Status: CANCELLED | OUTPATIENT
Start: 2024-06-10

## 2024-06-10 ASSESSMENT — ENCOUNTER SYMPTOMS
VOMITING: 0
ACTIVITY CHANGE: 0
NUMBNESS: 0
PSYCHIATRIC NEGATIVE: 1
ARTHRALGIAS: 1
PALPITATIONS: 0
WEAKNESS: 0
FATIGUE: 0
NAUSEA: 0
SHORTNESS OF BREATH: 0
WHEEZING: 0
RHINORRHEA: 0
SINUS PRESSURE: 0
LIGHT-HEADEDNESS: 0
SORE THROAT: 0
COUGH: 0
HEADACHES: 0
ABDOMINAL PAIN: 0
CONSTIPATION: 0
CHILLS: 0
ALLERGIC/IMMUNOLOGIC NEGATIVE: 1
ABDOMINAL DISTENTION: 0
DIARRHEA: 0
CHEST TIGHTNESS: 0
EYES NEGATIVE: 1
HEMATOLOGIC/LYMPHATIC NEGATIVE: 1
SINUS PAIN: 0
DIZZINESS: 0
FEVER: 0
ENDOCRINE NEGATIVE: 1
DYSURIA: 0

## 2024-06-10 NOTE — PROGRESS NOTES
Subjective   Patient ID: Marina Lopez is a 68 y.o. female who presents for Diabetes and Edema (Legs have been swelling at night).    Diabetes, controlled, A1c 5.9%.  Patient taking Trulicity 3 mg weekly, Actos 30 mg daily. Discussed lifestyle changes including portion control, food choices, and increased activity.  Urinary incontinence, referral to urogynecology  Changing skin lesions, referral to dermatology  Hypertension, controlled, taking amlodipine 5 mg daily, losartan 100 mg daily, chlorothiazide 25 mg daily.  She is having some ankle swelling which she did previously with amlodipine.  Will stop amlodipine and start carvedilol 3.125 mg twice a day.  Follow-up in 1 month to reevaluate BP.  Thyroid nodules, biopsy showed benign nodules.  Following with ENT.  Vertigo, controlled with meclizine.  Worse with weather changes.  Obesity, BMI 33.  Patient is working to get her diet back on track, she has lost a significant amount of weight already.  Incidental finding of possible pituitary adenoma on CT done in ER last year.  Although the addendum stated there was no abnormality.  She had an MRI which showed possible microadenoma, 6-month follow-up was recommended.   Due for MRI, has not completed.  Hyperlipidemia, intolerant of statins.  Taking Zetia.  Recommend low fat/low cholesterol diet, eat more fresh foods, avoid processed/prepackaged/fast foods, increase physical activity, weight loss. Recommend Mediterranean diet.    Preventive:  CRC screen: Ordered  Mammogram: 11/2023 negative  DEXA scan: 11/2023 normal  PAP:  CT cardiac scoring:  Ophthalmology:  Podiatry:  Urine albumin: 3/2023        The 10-year ASCVD risk score (Meir ALVARADO, et al., 2019) is: 22.8%    Values used to calculate the score:      Age: 68 years      Sex: Female      Is Non- : No      Diabetic: Yes      Tobacco smoker: No      Systolic Blood Pressure: 140 mmHg      Is BP treated: Yes      HDL Cholesterol: 37 mg/dL       Total Cholesterol: 164 mg/dL    Lab on 06/10/2024   Component Date Value Ref Range Status    WBC 06/10/2024 8.4  4.4 - 11.3 x10*3/uL Final    nRBC 06/10/2024 0.0  0.0 - 0.0 /100 WBCs Final    RBC 06/10/2024 4.06  4.00 - 5.20 x10*6/uL Final    Hemoglobin 06/10/2024 13.0  12.0 - 16.0 g/dL Final    Hematocrit 06/10/2024 39.9  36.0 - 46.0 % Final    MCV 06/10/2024 98  80 - 100 fL Final    MCH 06/10/2024 32.0  26.0 - 34.0 pg Final    MCHC 06/10/2024 32.6  32.0 - 36.0 g/dL Final    RDW 06/10/2024 13.0  11.5 - 14.5 % Final    Platelets 06/10/2024 146 (L)  150 - 450 x10*3/uL Final    Neutrophils % 06/10/2024 61.1  40.0 - 80.0 % Final    Immature Granulocytes %, Automated 06/10/2024 0.2  0.0 - 0.9 % Final    Immature Granulocyte Count (IG) includes promyelocytes, myelocytes and metamyelocytes but does not include bands. Percent differential counts (%) should be interpreted in the context of the absolute cell counts (cells/UL).    Lymphocytes % 06/10/2024 28.5  13.0 - 44.0 % Final    Monocytes % 06/10/2024 7.7  2.0 - 10.0 % Final    Eosinophils % 06/10/2024 1.9  0.0 - 6.0 % Final    Basophils % 06/10/2024 0.6  0.0 - 2.0 % Final    Neutrophils Absolute 06/10/2024 5.11  1.20 - 7.70 x10*3/uL Final    Percent differential counts (%) should be interpreted in the context of the absolute cell counts (cells/uL).    Immature Granulocytes Absolute, Au* 06/10/2024 0.02  0.00 - 0.70 x10*3/uL Final    Lymphocytes Absolute 06/10/2024 2.38  1.20 - 4.80 x10*3/uL Final    Monocytes Absolute 06/10/2024 0.64  0.10 - 1.00 x10*3/uL Final    Eosinophils Absolute 06/10/2024 0.16  0.00 - 0.70 x10*3/uL Final    Basophils Absolute 06/10/2024 0.05  0.00 - 0.10 x10*3/uL Final    Glucose 06/10/2024 108 (H)  74 - 99 mg/dL Final    Sodium 06/10/2024 139  136 - 145 mmol/L Final    Potassium 06/10/2024 4.7  3.5 - 5.3 mmol/L Final    Chloride 06/10/2024 105  98 - 107 mmol/L Final    Bicarbonate 06/10/2024 30  21 - 32 mmol/L Final    Anion Gap 06/10/2024 9 (L)   10 - 20 mmol/L Final    Urea Nitrogen 06/10/2024 23  6 - 23 mg/dL Final    Creatinine 06/10/2024 0.93  0.50 - 1.05 mg/dL Final    eGFR 06/10/2024 67  >60 mL/min/1.73m*2 Final    Calculations of estimated GFR are performed using the 2021 CKD-EPI Study Refit equation without the race variable for the IDMS-Traceable creatinine methods.  https://jasn.asnjournals.org/content/early/2021/09/22/ASN.1558937248    Calcium 06/10/2024 10.0  8.6 - 10.3 mg/dL Final    Albumin 06/10/2024 4.1  3.4 - 5.0 g/dL Final    Alkaline Phosphatase 06/10/2024 57  33 - 136 U/L Final    Total Protein 06/10/2024 7.3  6.4 - 8.2 g/dL Final    AST 06/10/2024 16  9 - 39 U/L Final    Bilirubin, Total 06/10/2024 0.5  0.0 - 1.2 mg/dL Final    ALT 06/10/2024 12  7 - 45 U/L Final    Patients treated with Sulfasalazine may generate falsely decreased results for ALT.    Cholesterol 06/10/2024 164  0 - 199 mg/dL Final          Age      Desirable   Borderline High   High     0-19 Y     0 - 169       170 - 199     >/= 200    20-24 Y     0 - 189       190 - 224     >/= 225         >24 Y     0 - 199       200 - 239     >/= 240   **All ranges are based on fasting samples. Specific   therapeutic targets will vary based on patient-specific   cardiac risk.    Pediatric guidelines reference:Pediatrics 2011, 128(S5).Adult guidelines reference: NCEP ATPIII Guidelines,SHILOH 2001, 258:2486-97    Venipuncture immediately after or during the administration of Metamizole may lead to falsely low results. Testing should be performed immediately prior to Metamizole dosing.    HDL-Cholesterol 06/10/2024 37.0  mg/dL Final      Age       Very Low   Low     Normal    High    0-19 Y    < 35      < 40     40-45     ----  20-24 Y    ----     < 40      >45      ----        >24 Y      ----     < 40     40-60      >60      Cholesterol/HDL Ratio 06/10/2024 4.4   Final      Ref Values  Desirable  < 3.4  High Risk  > 5.0    LDL Calculated 06/10/2024 104 (H)  <=99 mg/dL Final                                 Near   Borderline      AGE      Desirable  Optimal    High     High     Very High     0-19 Y     0 - 109     ---    110-129   >/= 130     ----    20-24 Y     0 - 119     ---    120-159   >/= 160     ----      >24 Y     0 -  99   100-129  130-159   160-189     >/=190      VLDL 06/10/2024 23  0 - 40 mg/dL Final    Triglycerides 06/10/2024 114  0 - 149 mg/dL Final       Age         Desirable   Borderline High   High     Very High   0 D-90 D    19 - 174         ----         ----        ----  91 D- 9 Y     0 -  74        75 -  99     >/= 100      ----    10-19 Y     0 -  89        90 - 129     >/= 130      ----    20-24 Y     0 - 114       115 - 149     >/= 150      ----         >24 Y     0 - 149       150 - 199    200- 499    >/= 500    Venipuncture immediately after or during the administration of Metamizole may lead to falsely low results. Testing should be performed immediately prior to Metamizole dosing.    Non HDL Cholesterol 06/10/2024 127  0 - 149 mg/dL Final          Age       Desirable   Borderline High   High     Very High     0-19 Y     0 - 119       120 - 144     >/= 145    >/= 160    20-24 Y     0 - 149       150 - 189     >/= 190      ----         >24 Y    30 mg/dL above LDL Cholesterol goal      Thyroid Stimulating Hormone 06/10/2024 1.12  0.44 - 3.98 mIU/L Final   Office Visit on 06/10/2024   Component Date Value Ref Range Status    POC Fingerstick Blood Glucose 06/10/2024 108 (A)  70 - 100 mg/dl Final    POC HEMOGLOBIN A1c 06/10/2024 5.9  4.2 - 6.5 % Final       XR shoulder left 1 view  Narrative: Interpreted By:  Yaw Akbar,   STUDY:  XR SHOULDER LEFT 1 VIEW;  4/17/2024 10:01 am      INDICATION:  Signs/Symptoms:axiallry view left shoulder pain.      COMPARISON:  None.      ACCESSION NUMBER(S):  [id number]      ORDERING CLINICIAN:  LESLIE GROSS      FINDINGS:  Single limited axillary view of the left shoulder reveals no evidence  of a dislocation. No acute fractures are identified,  within the  limits of the single view. Degenerative spurring noted.      Impression: Degenerative changes. Within the limits of the single view, no  evidence of a fracture or dislocation.      MACRO:  None.      Signed by: Yaw Akbar 4/18/2024 2:25 PM  Dictation workstation:   [id number]       Review of Systems   Constitutional:  Negative for activity change, chills, fatigue and fever.   HENT:  Negative for congestion, rhinorrhea, sinus pressure, sinus pain and sore throat.    Eyes: Negative.    Respiratory:  Negative for cough, chest tightness, shortness of breath and wheezing.    Cardiovascular:  Positive for leg swelling. Negative for chest pain and palpitations.   Gastrointestinal:  Negative for abdominal distention, abdominal pain, constipation, diarrhea, nausea and vomiting.   Endocrine: Negative.    Genitourinary:  Negative for dysuria.   Musculoskeletal:  Positive for arthralgias.   Skin: Negative.    Allergic/Immunologic: Negative.    Neurological:  Negative for dizziness, syncope, weakness, light-headedness, numbness and headaches.   Hematological: Negative.    Psychiatric/Behavioral: Negative.     All other systems reviewed and are negative.      Objective   Visit Vitals  /80   Pulse 67   Temp 36.2 °C (97.2 °F)   Wt 86.5 kg (190 lb 12.8 oz)   SpO2 99%   BMI 33.80 kg/m²   OB Status Postmenopausal   Smoking Status Never   BSA 1.96 m²      Physical Exam  Vitals and nursing note reviewed.   Constitutional:       General: She is not in acute distress.     Appearance: Normal appearance. She is obese. She is not ill-appearing.   HENT:      Head: Normocephalic and atraumatic.      Right Ear: Tympanic membrane, ear canal and external ear normal.      Left Ear: Tympanic membrane, ear canal and external ear normal.      Nose: Nose normal.      Mouth/Throat:      Mouth: Mucous membranes are moist.      Pharynx: Oropharynx is clear.   Eyes:      Pupils: Pupils are equal, round, and reactive to light.    Cardiovascular:      Rate and Rhythm: Normal rate and regular rhythm.      Pulses: Normal pulses.      Heart sounds: Normal heart sounds. No murmur heard.  Pulmonary:      Effort: Pulmonary effort is normal. No respiratory distress.      Breath sounds: Normal breath sounds. No wheezing.   Abdominal:      General: Bowel sounds are normal. There is no distension.      Palpations: Abdomen is soft.      Tenderness: There is no abdominal tenderness.   Musculoskeletal:         General: No tenderness. Normal range of motion.      Cervical back: Normal range of motion and neck supple.      Right lower leg: No edema.      Left lower leg: No edema.   Skin:     General: Skin is warm and dry.      Capillary Refill: Capillary refill takes less than 2 seconds.      Coloration: Skin is not jaundiced.   Neurological:      General: No focal deficit present.      Mental Status: She is alert and oriented to person, place, and time.      Cranial Nerves: Cranial nerves 2-12 are intact.      Sensory: Sensation is intact.      Motor: Motor function is intact. No weakness.      Coordination: Coordination is intact.   Psychiatric:         Mood and Affect: Mood normal.         Behavior: Behavior normal.         Thought Content: Thought content normal.         Judgment: Judgment normal.         Assessment/Plan   Marina was seen today for diabetes and edema.  Diagnoses and all orders for this visit:  Type 2 diabetes mellitus with hyperglycemia, without long-term current use of insulin (Multi) (Primary)  -     POCT fingerstick glucose manually resulted  -     POCT glycosylated hemoglobin (Hb A1C) manually resulted  -     dulaglutide (Trulicity) 3 mg/0.5 mL pen injector; AS DIRECTED SUBCUTANEOUS ONCE A WEEK 30 DAYS for 30  -     pioglitazone (Actos) 30 mg tablet; Take 1 tablet (30 mg) by mouth once daily.  -     CBC and Auto Differential; Future  -     Comprehensive Metabolic Panel; Future  -     Albumin , Urine Random; Future  -     Vitamin  B12; Future  Essential hypertension  -     ezetimibe (Zetia) 10 mg tablet; Take 1 tablet (10 mg) by mouth once daily.  -     hydroCHLOROthiazide (HYDRODiuril) 25 mg tablet; Take 1 tablet (25 mg) by mouth once daily.  -     losartan (Cozaar) 100 mg tablet; Take 1 tablet (100 mg) by mouth once daily.  -     carvedilol (Coreg) 3.125 mg tablet; Take 1 tablet (3.125 mg) by mouth 2 times daily (morning and late afternoon).  Abnormality of pituitary gland (Multi)  Urinary incontinence, unspecified type  -     Referral to Urogynecology; Future  Skin lesions  -     Referral to Dermatology  Vitamin D deficiency  -     Vitamin D 25-Hydroxy,Total (for eval of Vitamin D levels); Future  Mixed hyperlipidemia  -     Lipid Panel; Future  -     TSH with reflex to Free T4 if abnormal; Future  Class 1 obesity with serious comorbidity and body mass index (BMI) of 33.0 to 33.9 in adult, unspecified obesity type  Other orders  -     Follow Up In Primary Care - Established; Future     # Diabetes, controlled  -Continue Trulicity 3 mg weekly, Actos 30 mg daily  -Low flat/cholesterol, low sodium, carbohydrate controlled diet  -Exercise as tolerated 150 minutes/week, gradually increase activity  -Weight loss  -Regular follow-up with ophthalmology and podiatry  # Hypertension, controlled  -Continue HCTZ 25 mg daily, losartan 100 mg daily  -Stop amlodipine due to ankle swelling  -Start carvedilol 3.125 mg twice a day  -Low fat/cholesterol, low sodium, low carbohydrate diet  -Exercise as tolerated 150 minutes/week, increase activity slowly  -Maintain healthy weight  # Obesity, BMI 33  -Avoid sweets and sugary drinks  -Drink plenty of water  -Avoid processed foods and refined foods  -Eat fruits, vegetables, lean protein, whole grains  -Increase your activity level  # Hyperlipidemia  -Intolerant of statins  -Continue Zetia  -Low fat/low cholesterol diet  -Eat more fresh foods  -Avoid processed/prepackaged/fast foods  -Gradually increase physical  activity  -Weight loss  -Follow-up with cardiology  # Thyroid nodule  -Follow-up with ENT  # Possible pituitary microadenoma  -Due for 6-month follow-up MRI    Follow-up 1 month and as needed  Patient was identified as a fall risk. Risk prevention instructions provided.

## 2024-06-11 LAB
CREAT UR-MCNC: 39.9 MG/DL (ref 20–320)
MICROALBUMIN UR-MCNC: <7 MG/L
MICROALBUMIN/CREAT UR: NORMAL MG/G{CREAT}

## 2024-06-13 ENCOUNTER — APPOINTMENT (OUTPATIENT)
Dept: GASTROENTEROLOGY | Facility: HOSPITAL | Age: 69
End: 2024-06-13
Payer: MEDICARE

## 2024-07-08 ENCOUNTER — APPOINTMENT (OUTPATIENT)
Dept: PRIMARY CARE | Facility: CLINIC | Age: 69
End: 2024-07-08
Payer: MEDICARE

## 2024-08-18 ASSESSMENT — ENCOUNTER SYMPTOMS
SINUS PAIN: 0
VOMITING: 0
ENDOCRINE NEGATIVE: 1
RHINORRHEA: 0
WHEEZING: 0
FEVER: 0
CHEST TIGHTNESS: 0
CHILLS: 0
SORE THROAT: 0
DIZZINESS: 0
COUGH: 0
WEAKNESS: 0
NAUSEA: 0
HEMATOLOGIC/LYMPHATIC NEGATIVE: 1
PSYCHIATRIC NEGATIVE: 1
PALPITATIONS: 0
SINUS PRESSURE: 0
DIARRHEA: 0
HEADACHES: 0
ABDOMINAL DISTENTION: 0
CONSTIPATION: 0
SHORTNESS OF BREATH: 0
ARTHRALGIAS: 1
ACTIVITY CHANGE: 0
ABDOMINAL PAIN: 0
NUMBNESS: 0
DYSURIA: 0
EYES NEGATIVE: 1
LIGHT-HEADEDNESS: 0
FATIGUE: 0
ALLERGIC/IMMUNOLOGIC NEGATIVE: 1

## 2024-08-18 NOTE — PROGRESS NOTES
Subjective   Patient ID: Marina Lopez is a 69 y.o. female who presents for Diabetes, Hypertension, and UTI (Burning, pain, frequency, cloudy).    C/o several days of burning, cloudy urine, urgency, frequency, dysuria.  Denies fever, chills, n/v, diarrhea, chest pain, dyspnea.  UA positive for UTI, start Macrobid for 5 days.  Educated to stay well-hydrated.  Instructed to call the office if symptoms worsen or do not improve..  Diabetes, controlled, A1c 5.9%.  Patient taking Trulicity 3 mg weekly, Actos 30 mg daily. Discussed lifestyle changes including portion control, food choices, and increased activity.  Urinary incontinence, referral to urogynecology  Changing skin lesions, referral to dermatology  Hypertension, not controlled, taking losartan 100 mg daily, chlorothiazide 25 mg daily, carvedilol 3.125 mg twice a day.  Increase carvedilol to 6.25 mg twice a day.  Follow-up in 1 month for reevaluation.  Leg edema resolved with stopping amlodipine.  Thyroid nodules, biopsy showed benign nodules.  Following with ENT.  Vertigo, controlled with meclizine.  Worse with weather changes.  Obesity, BMI 33.  Patient is working to get her diet back on track, she has lost a significant amount of weight already.  Incidental finding of possible pituitary adenoma on CT done in ER last year.  Although the addendum stated there was no abnormality.  She had an MRI which showed possible microadenoma, 6-month follow-up was recommended.   Due for MRI, has not completed.  Hyperlipidemia, intolerant of statins.  Taking Zetia.  Recommend low fat/low cholesterol diet, eat more fresh foods, avoid processed/prepackaged/fast foods, increase physical activity, weight loss. Recommend Mediterranean diet.    Preventive:  CRC screen: Ordered  Mammogram: 11/2023 negative  DEXA scan: 11/2023 normal  PAP:  CT cardiac scoring:  Ophthalmology:  Podiatry:  Urine albumin: 3/2023    ACEi/ARB: Yes  Statin: Intolerant, taking Zetia  ASA:        The 10-year  ASCVD risk score (Meir ALVARADO, et al., 2019) is: 37.3%    Values used to calculate the score:      Age: 69 years      Sex: Female      Is Non- : No      Diabetic: Yes      Tobacco smoker: No      Systolic Blood Pressure: 178 mmHg      Is BP treated: Yes      HDL Cholesterol: 37 mg/dL      Total Cholesterol: 164 mg/dL    Lab on 08/26/2024   Component Date Value Ref Range Status    Color, Urine 08/26/2024 Yellow  Light-Yellow, Yellow, Dark-Yellow Final    Appearance, Urine 08/26/2024 Turbid (N)  Clear Final    Specific Gravity, Urine 08/26/2024 1.021  1.005 - 1.035 Final    pH, Urine 08/26/2024 5.5  5.0, 5.5, 6.0, 6.5, 7.0, 7.5, 8.0 Final    Protein, Urine 08/26/2024 10 (TRACE)  NEGATIVE, 10 (TRACE), 20 (TRACE) mg/dL Final    Glucose, Urine 08/26/2024 Normal  Normal mg/dL Final    Blood, Urine 08/26/2024 NEGATIVE  NEGATIVE Final    Ketones, Urine 08/26/2024 NEGATIVE  NEGATIVE mg/dL Final    Bilirubin, Urine 08/26/2024 NEGATIVE  NEGATIVE Final    Urobilinogen, Urine 08/26/2024 Normal  Normal mg/dL Final    Nitrite, Urine 08/26/2024 NEGATIVE  NEGATIVE Final    Leukocyte Esterase, Urine 08/26/2024 500 Ritu/µL (A)  NEGATIVE Final    Extra Tube 08/26/2024 Hold for add-ons.   Final    Auto resulted.    WBC, Urine 08/26/2024 >50 (A)  1-5, NONE /HPF Final    WBC Clumps, Urine 08/26/2024 OCCASIONAL  Reference range not established. /HPF Final    RBC, Urine 08/26/2024 6-10 (A)  NONE, 1-2, 3-5 /HPF Final    Squamous Epithelial Cells, Urine 08/26/2024 1-9 (SPARSE)  Reference range not established. /HPF Final    Bacteria, Urine 08/26/2024 1+ (A)  NONE SEEN /HPF Final    Mucus, Urine 08/26/2024 FEW  Reference range not established. /LPF Final   Lab on 06/10/2024   Component Date Value Ref Range Status    WBC 06/10/2024 8.4  4.4 - 11.3 x10*3/uL Final    nRBC 06/10/2024 0.0  0.0 - 0.0 /100 WBCs Final    RBC 06/10/2024 4.06  4.00 - 5.20 x10*6/uL Final    Hemoglobin 06/10/2024 13.0  12.0 - 16.0 g/dL Final     Hematocrit 06/10/2024 39.9  36.0 - 46.0 % Final    MCV 06/10/2024 98  80 - 100 fL Final    MCH 06/10/2024 32.0  26.0 - 34.0 pg Final    MCHC 06/10/2024 32.6  32.0 - 36.0 g/dL Final    RDW 06/10/2024 13.0  11.5 - 14.5 % Final    Platelets 06/10/2024 146 (L)  150 - 450 x10*3/uL Final    Neutrophils % 06/10/2024 61.1  40.0 - 80.0 % Final    Immature Granulocytes %, Automated 06/10/2024 0.2  0.0 - 0.9 % Final    Immature Granulocyte Count (IG) includes promyelocytes, myelocytes and metamyelocytes but does not include bands. Percent differential counts (%) should be interpreted in the context of the absolute cell counts (cells/UL).    Lymphocytes % 06/10/2024 28.5  13.0 - 44.0 % Final    Monocytes % 06/10/2024 7.7  2.0 - 10.0 % Final    Eosinophils % 06/10/2024 1.9  0.0 - 6.0 % Final    Basophils % 06/10/2024 0.6  0.0 - 2.0 % Final    Neutrophils Absolute 06/10/2024 5.11  1.20 - 7.70 x10*3/uL Final    Percent differential counts (%) should be interpreted in the context of the absolute cell counts (cells/uL).    Immature Granulocytes Absolute, Au* 06/10/2024 0.02  0.00 - 0.70 x10*3/uL Final    Lymphocytes Absolute 06/10/2024 2.38  1.20 - 4.80 x10*3/uL Final    Monocytes Absolute 06/10/2024 0.64  0.10 - 1.00 x10*3/uL Final    Eosinophils Absolute 06/10/2024 0.16  0.00 - 0.70 x10*3/uL Final    Basophils Absolute 06/10/2024 0.05  0.00 - 0.10 x10*3/uL Final    Glucose 06/10/2024 108 (H)  74 - 99 mg/dL Final    Sodium 06/10/2024 139  136 - 145 mmol/L Final    Potassium 06/10/2024 4.7  3.5 - 5.3 mmol/L Final    Chloride 06/10/2024 105  98 - 107 mmol/L Final    Bicarbonate 06/10/2024 30  21 - 32 mmol/L Final    Anion Gap 06/10/2024 9 (L)  10 - 20 mmol/L Final    Urea Nitrogen 06/10/2024 23  6 - 23 mg/dL Final    Creatinine 06/10/2024 0.93  0.50 - 1.05 mg/dL Final    eGFR 06/10/2024 67  >60 mL/min/1.73m*2 Final    Calculations of estimated GFR are performed using the 2021 CKD-EPI Study Refit equation without the race variable for  the IDMS-Traceable creatinine methods.  https://jasn.asnjournals.org/content/early/2021/09/22/ASN.9402618109    Calcium 06/10/2024 10.0  8.6 - 10.3 mg/dL Final    Albumin 06/10/2024 4.1  3.4 - 5.0 g/dL Final    Alkaline Phosphatase 06/10/2024 57  33 - 136 U/L Final    Total Protein 06/10/2024 7.3  6.4 - 8.2 g/dL Final    AST 06/10/2024 16  9 - 39 U/L Final    Bilirubin, Total 06/10/2024 0.5  0.0 - 1.2 mg/dL Final    ALT 06/10/2024 12  7 - 45 U/L Final    Patients treated with Sulfasalazine may generate falsely decreased results for ALT.    Albumin, Urine Random 06/10/2024 <7.0  Not established mg/L Final    Creatinine, Urine Random 06/10/2024 39.9  20.0 - 320.0 mg/dL Final    Albumin/Creatinine Ratio 06/10/2024    Final    One or more analytes used in this calculation is outside of the analytical measurement range. Calculation cannot be performed.    Cholesterol 06/10/2024 164  0 - 199 mg/dL Final          Age      Desirable   Borderline High   High     0-19 Y     0 - 169       170 - 199     >/= 200    20-24 Y     0 - 189       190 - 224     >/= 225         >24 Y     0 - 199       200 - 239     >/= 240   **All ranges are based on fasting samples. Specific   therapeutic targets will vary based on patient-specific   cardiac risk.    Pediatric guidelines reference:Pediatrics 2011, 128(S5).Adult guidelines reference: NCEP ATPIII Guidelines,SHILOH 2001, 258:2486-97    Venipuncture immediately after or during the administration of Metamizole may lead to falsely low results. Testing should be performed immediately prior to Metamizole dosing.    HDL-Cholesterol 06/10/2024 37.0  mg/dL Final      Age       Very Low   Low     Normal    High    0-19 Y    < 35      < 40     40-45     ----  20-24 Y    ----     < 40      >45      ----        >24 Y      ----     < 40     40-60      >60      Cholesterol/HDL Ratio 06/10/2024 4.4   Final      Ref Values  Desirable  < 3.4  High Risk  > 5.0    LDL Calculated 06/10/2024 104 (H)  <=99 mg/dL  Final                                Near   Borderline      AGE      Desirable  Optimal    High     High     Very High     0-19 Y     0 - 109     ---    110-129   >/= 130     ----    20-24 Y     0 - 119     ---    120-159   >/= 160     ----      >24 Y     0 -  99   100-129  130-159   160-189     >/=190      VLDL 06/10/2024 23  0 - 40 mg/dL Final    Triglycerides 06/10/2024 114  0 - 149 mg/dL Final       Age         Desirable   Borderline High   High     Very High   0 D-90 D    19 - 174         ----         ----        ----  91 D- 9 Y     0 -  74        75 -  99     >/= 100      ----    10-19 Y     0 -  89        90 - 129     >/= 130      ----    20-24 Y     0 - 114       115 - 149     >/= 150      ----         >24 Y     0 - 149       150 - 199    200- 499    >/= 500    Venipuncture immediately after or during the administration of Metamizole may lead to falsely low results. Testing should be performed immediately prior to Metamizole dosing.    Non HDL Cholesterol 06/10/2024 127  0 - 149 mg/dL Final          Age       Desirable   Borderline High   High     Very High     0-19 Y     0 - 119       120 - 144     >/= 145    >/= 160    20-24 Y     0 - 149       150 - 189     >/= 190      ----         >24 Y    30 mg/dL above LDL Cholesterol goal      Thyroid Stimulating Hormone 06/10/2024 1.12  0.44 - 3.98 mIU/L Final    Vitamin B12 06/10/2024 >2,000 (H)  211 - 911 pg/mL Final    Vitamin D, 25-Hydroxy, Total 06/10/2024 43  30 - 100 ng/mL Final   Office Visit on 06/10/2024   Component Date Value Ref Range Status    POC Fingerstick Blood Glucose 06/10/2024 108 (A)  70 - 100 mg/dl Final    POC HEMOGLOBIN A1c 06/10/2024 5.9  4.2 - 6.5 % Final       XR shoulder left 1 view  Narrative: Interpreted By:  Yaw Akbar,   STUDY:  XR SHOULDER LEFT 1 VIEW;  4/17/2024 10:01 am      INDICATION:  Signs/Symptoms:axiallry view left shoulder pain.      COMPARISON:  None.      ACCESSION NUMBER(S):  [id number]      ORDERING  CLINICIAN:  LESLIE GROSS      FINDINGS:  Single limited axillary view of the left shoulder reveals no evidence  of a dislocation. No acute fractures are identified, within the  limits of the single view. Degenerative spurring noted.      Impression: Degenerative changes. Within the limits of the single view, no  evidence of a fracture or dislocation.      MACRO:  None.      Signed by: Yaw Akbar 4/18/2024 2:25 PM  Dictation workstation:   [id number]       Review of Systems   Constitutional:  Negative for activity change, chills, fatigue and fever.   HENT:  Negative for congestion, rhinorrhea, sinus pressure, sinus pain and sore throat.    Eyes: Negative.    Respiratory:  Negative for cough, chest tightness, shortness of breath and wheezing.    Cardiovascular:  Negative for chest pain and palpitations.   Gastrointestinal:  Negative for abdominal distention, abdominal pain, constipation, diarrhea, nausea and vomiting.   Endocrine: Negative.    Genitourinary:  Negative for dysuria.   Musculoskeletal:  Positive for arthralgias.   Skin: Negative.    Allergic/Immunologic: Negative.    Neurological:  Negative for dizziness, syncope, weakness, light-headedness, numbness and headaches.   Hematological: Negative.    Psychiatric/Behavioral: Negative.     All other systems reviewed and are negative.      Objective   Visit Vitals  /84   Pulse 66   Temp 36.3 °C (97.3 °F)   SpO2 98%   OB Status Postmenopausal   Smoking Status Never      Physical Exam  Vitals and nursing note reviewed.   Constitutional:       General: She is not in acute distress.     Appearance: Normal appearance. She is obese. She is not ill-appearing.   HENT:      Head: Normocephalic and atraumatic.      Right Ear: Tympanic membrane, ear canal and external ear normal.      Left Ear: Tympanic membrane, ear canal and external ear normal.      Nose: Nose normal.      Mouth/Throat:      Mouth: Mucous membranes are moist.      Pharynx: Oropharynx is clear.    Eyes:      Pupils: Pupils are equal, round, and reactive to light.   Cardiovascular:      Rate and Rhythm: Normal rate and regular rhythm.      Pulses: Normal pulses.      Heart sounds: Normal heart sounds. No murmur heard.  Pulmonary:      Effort: Pulmonary effort is normal. No respiratory distress.      Breath sounds: Normal breath sounds. No wheezing.   Abdominal:      General: Bowel sounds are normal. There is no distension.      Palpations: Abdomen is soft.      Tenderness: There is no abdominal tenderness.   Musculoskeletal:         General: No tenderness. Normal range of motion.      Cervical back: Normal range of motion and neck supple.      Right lower leg: No edema.      Left lower leg: No edema.   Skin:     General: Skin is warm and dry.      Capillary Refill: Capillary refill takes less than 2 seconds.      Coloration: Skin is not jaundiced.   Neurological:      General: No focal deficit present.      Mental Status: She is alert and oriented to person, place, and time.      Cranial Nerves: Cranial nerves 2-12 are intact.      Sensory: Sensation is intact.      Motor: Motor function is intact. No weakness.      Coordination: Coordination is intact.   Psychiatric:         Mood and Affect: Mood normal.         Behavior: Behavior normal.         Thought Content: Thought content normal.         Judgment: Judgment normal.         Assessment/Plan   Marina was seen today for diabetes, hypertension and uti.  Diagnoses and all orders for this visit:  Type 2 diabetes mellitus with hyperglycemia, without long-term current use of insulin (Multi) (Primary)  -     dulaglutide (Trulicity) 3 mg/0.5 mL pen injector; AS DIRECTED SUBCUTANEOUS ONCE A WEEK 30 DAYS for 30  -     pioglitazone (Actos) 30 mg tablet; Take 1 tablet (30 mg) by mouth once daily.  Urinary symptom or sign  -     Urinalysis with Reflex Culture and Microscopic; Future  Essential hypertension  -     carvedilol (Coreg) 6.25 mg tablet; Take 1 tablet (6.25  mg) by mouth 2 times daily (morning and late afternoon).  -     ezetimibe (Zetia) 10 mg tablet; Take 1 tablet (10 mg) by mouth once daily.  -     hydroCHLOROthiazide (HYDRODiuril) 25 mg tablet; Take 1 tablet (25 mg) by mouth once daily.  -     losartan (Cozaar) 100 mg tablet; Take 1 tablet (100 mg) by mouth once daily.  Acute cystitis without hematuria  -     nitrofurantoin, macrocrystal-monohydrate, (Macrobid) 100 mg capsule; Take 1 capsule (100 mg) by mouth 2 times a day for 5 days.  Low platelet count (CMS-HCC)  -     CBC and Auto Differential; Future  Other orders  -     Follow Up In Primary Care - Established; Future     # Diabetes, controlled  -Continue Trulicity 3 mg weekly, Actos 30 mg daily  -Low flat/cholesterol, low sodium, carbohydrate controlled diet  -Exercise as tolerated 150 minutes/week, gradually increase activity  -Weight loss  -Regular follow-up with ophthalmology and podiatry  # Hypertension, not controlled  -Continue HCTZ 25 mg daily, losartan 100 mg daily  -Increase carvedilol to 6.25 mg twice a day  -Low fat/cholesterol, low sodium, low carbohydrate diet  -Exercise as tolerated 150 minutes/week, increase activity slowly  -Weight loss  # Obesity, BMI 33  -Avoid sweets and sugary drinks  -Drink plenty of water  -Avoid processed foods and refined foods  -Eat fruits, vegetables, lean protein, whole grains  -Increase your activity level  # Hyperlipidemia  -Intolerant of statins  -Continue Zetia  -Low fat/low cholesterol diet  -Eat more fresh foods  -Avoid processed/prepackaged/fast foods  -Gradually increase physical activity  -Weight loss  -Follow-up with cardiology  # Thyroid nodule  -Follow-up with ENT  # Possible pituitary microadenoma  -Due for 6-month follow-up MRI    Follow-up 1 month  Follow-up as needed with me until 10/11/2024 and follow up after with new provider  Patient advised I will be leaving [facility], my last day is October 11, 2024. Discussed options for new provider.

## 2024-08-21 ENCOUNTER — APPOINTMENT (OUTPATIENT)
Dept: DERMATOLOGY | Facility: CLINIC | Age: 69
End: 2024-08-21
Payer: MEDICARE

## 2024-08-26 ENCOUNTER — APPOINTMENT (OUTPATIENT)
Dept: PRIMARY CARE | Facility: CLINIC | Age: 69
End: 2024-08-26
Payer: MEDICARE

## 2024-08-26 ENCOUNTER — LAB (OUTPATIENT)
Dept: LAB | Facility: LAB | Age: 69
End: 2024-08-26
Payer: MEDICARE

## 2024-08-26 VITALS
SYSTOLIC BLOOD PRESSURE: 178 MMHG | DIASTOLIC BLOOD PRESSURE: 84 MMHG | HEART RATE: 66 BPM | TEMPERATURE: 97.3 F | OXYGEN SATURATION: 98 %

## 2024-08-26 DIAGNOSIS — R39.9 URINARY SYMPTOM OR SIGN: ICD-10-CM

## 2024-08-26 DIAGNOSIS — N30.00 ACUTE CYSTITIS WITHOUT HEMATURIA: ICD-10-CM

## 2024-08-26 DIAGNOSIS — E11.65 TYPE 2 DIABETES MELLITUS WITH HYPERGLYCEMIA, WITHOUT LONG-TERM CURRENT USE OF INSULIN (MULTI): Primary | ICD-10-CM

## 2024-08-26 DIAGNOSIS — I10 ESSENTIAL HYPERTENSION: ICD-10-CM

## 2024-08-26 DIAGNOSIS — D69.6 LOW PLATELET COUNT (CMS-HCC): ICD-10-CM

## 2024-08-26 LAB
APPEARANCE UR: ABNORMAL
BACTERIA #/AREA URNS AUTO: ABNORMAL /HPF
BILIRUB UR STRIP.AUTO-MCNC: NEGATIVE MG/DL
COLOR UR: YELLOW
GLUCOSE UR STRIP.AUTO-MCNC: NORMAL MG/DL
KETONES UR STRIP.AUTO-MCNC: NEGATIVE MG/DL
LEUKOCYTE ESTERASE UR QL STRIP.AUTO: ABNORMAL
MUCOUS THREADS #/AREA URNS AUTO: ABNORMAL /LPF
NITRITE UR QL STRIP.AUTO: NEGATIVE
PH UR STRIP.AUTO: 5.5 [PH]
PROT UR STRIP.AUTO-MCNC: ABNORMAL MG/DL
RBC # UR STRIP.AUTO: NEGATIVE /UL
RBC #/AREA URNS AUTO: ABNORMAL /HPF
SP GR UR STRIP.AUTO: 1.02
SQUAMOUS #/AREA URNS AUTO: ABNORMAL /HPF
UROBILINOGEN UR STRIP.AUTO-MCNC: NORMAL MG/DL
WBC #/AREA URNS AUTO: >50 /HPF
WBC CLUMPS #/AREA URNS AUTO: ABNORMAL /HPF

## 2024-08-26 PROCEDURE — 1159F MED LIST DOCD IN RCRD: CPT | Performed by: NURSE PRACTITIONER

## 2024-08-26 PROCEDURE — 3079F DIAST BP 80-89 MM HG: CPT | Performed by: NURSE PRACTITIONER

## 2024-08-26 PROCEDURE — 3077F SYST BP >= 140 MM HG: CPT | Performed by: NURSE PRACTITIONER

## 2024-08-26 PROCEDURE — 87186 SC STD MICRODIL/AGAR DIL: CPT

## 2024-08-26 PROCEDURE — 87086 URINE CULTURE/COLONY COUNT: CPT

## 2024-08-26 PROCEDURE — 99214 OFFICE O/P EST MOD 30 MIN: CPT | Performed by: NURSE PRACTITIONER

## 2024-08-26 PROCEDURE — 1160F RVW MEDS BY RX/DR IN RCRD: CPT | Performed by: NURSE PRACTITIONER

## 2024-08-26 PROCEDURE — 3049F LDL-C 100-129 MG/DL: CPT | Performed by: NURSE PRACTITIONER

## 2024-08-26 PROCEDURE — 4010F ACE/ARB THERAPY RXD/TAKEN: CPT | Performed by: NURSE PRACTITIONER

## 2024-08-26 PROCEDURE — 3062F POS MACROALBUMINURIA REV: CPT | Performed by: NURSE PRACTITIONER

## 2024-08-26 RX ORDER — LOSARTAN POTASSIUM 100 MG/1
100 TABLET ORAL DAILY
Qty: 90 TABLET | Refills: 1 | Status: SHIPPED | OUTPATIENT
Start: 2024-08-26

## 2024-08-26 RX ORDER — PIOGLITAZONEHYDROCHLORIDE 30 MG/1
30 TABLET ORAL DAILY
Qty: 90 TABLET | Refills: 1 | Status: SHIPPED | OUTPATIENT
Start: 2024-08-26

## 2024-08-26 RX ORDER — NITROFURANTOIN 25; 75 MG/1; MG/1
100 CAPSULE ORAL 2 TIMES DAILY
Qty: 10 CAPSULE | Refills: 0 | Status: SHIPPED | OUTPATIENT
Start: 2024-08-26 | End: 2024-08-31

## 2024-08-26 RX ORDER — EZETIMIBE 10 MG/1
10 TABLET ORAL DAILY
Qty: 90 TABLET | Refills: 1 | Status: SHIPPED | OUTPATIENT
Start: 2024-08-26

## 2024-08-26 RX ORDER — DULAGLUTIDE 3 MG/.5ML
INJECTION, SOLUTION SUBCUTANEOUS
Qty: 6 ML | Refills: 1 | Status: SHIPPED | OUTPATIENT
Start: 2024-08-26

## 2024-08-26 RX ORDER — HYDROCHLOROTHIAZIDE 25 MG/1
25 TABLET ORAL DAILY
Qty: 90 TABLET | Refills: 1 | Status: SHIPPED | OUTPATIENT
Start: 2024-08-26

## 2024-08-26 RX ORDER — AMLODIPINE BESYLATE 5 MG/1
TABLET ORAL
Qty: 30 TABLET | Refills: 0 | Status: CANCELLED | OUTPATIENT
Start: 2024-08-26

## 2024-08-26 RX ORDER — CARVEDILOL 6.25 MG/1
6.25 TABLET ORAL
Qty: 180 TABLET | Refills: 1 | Status: SHIPPED | OUTPATIENT
Start: 2024-08-26

## 2024-08-27 LAB — HOLD SPECIMEN: NORMAL

## 2024-08-29 LAB — BACTERIA UR CULT: ABNORMAL

## 2024-09-26 ASSESSMENT — ENCOUNTER SYMPTOMS
LIGHT-HEADEDNESS: 0
VOMITING: 0
ALLERGIC/IMMUNOLOGIC NEGATIVE: 1
WEAKNESS: 0
ABDOMINAL DISTENTION: 0
RHINORRHEA: 0
HEMATOLOGIC/LYMPHATIC NEGATIVE: 1
PALPITATIONS: 0
ABDOMINAL PAIN: 0
HEADACHES: 0
FEVER: 0
WHEEZING: 0
CHILLS: 0
DYSURIA: 0
DIZZINESS: 0
ACTIVITY CHANGE: 0
ARTHRALGIAS: 1
SHORTNESS OF BREATH: 0
ENDOCRINE NEGATIVE: 1
NAUSEA: 0
COUGH: 0
NUMBNESS: 0
FATIGUE: 0
CHEST TIGHTNESS: 0
SINUS PRESSURE: 0
SORE THROAT: 0
DIARRHEA: 0
CONSTIPATION: 0
PSYCHIATRIC NEGATIVE: 1
EYES NEGATIVE: 1
SINUS PAIN: 0

## 2024-09-26 NOTE — PROGRESS NOTES
Subjective   Patient ID: Marina Lopez is a 69 y.o. female who presents for Hypertension.    Hypertension,, taking losartan 100 mg daily, chlorothiazide 25 mg daily, carvedilol  mg twice a day. Leg edema resolved with stopping amlodipine.        The 10-year ASCVD risk score (Meir ALVARADO, et al., 2019) is: 19.5%    Values used to calculate the score:      Age: 69 years      Sex: Female      Is Non- : No      Diabetic: Yes      Tobacco smoker: No      Systolic Blood Pressure: 122 mmHg      Is BP treated: Yes      HDL Cholesterol: 37 mg/dL      Total Cholesterol: 164 mg/dL    Lab on 08/26/2024   Component Date Value Ref Range Status    Color, Urine 08/26/2024 Yellow  Light-Yellow, Yellow, Dark-Yellow Final    Appearance, Urine 08/26/2024 Turbid (N)  Clear Final    Specific Gravity, Urine 08/26/2024 1.021  1.005 - 1.035 Final    pH, Urine 08/26/2024 5.5  5.0, 5.5, 6.0, 6.5, 7.0, 7.5, 8.0 Final    Protein, Urine 08/26/2024 10 (TRACE)  NEGATIVE, 10 (TRACE), 20 (TRACE) mg/dL Final    Glucose, Urine 08/26/2024 Normal  Normal mg/dL Final    Blood, Urine 08/26/2024 NEGATIVE  NEGATIVE Final    Ketones, Urine 08/26/2024 NEGATIVE  NEGATIVE mg/dL Final    Bilirubin, Urine 08/26/2024 NEGATIVE  NEGATIVE Final    Urobilinogen, Urine 08/26/2024 Normal  Normal mg/dL Final    Nitrite, Urine 08/26/2024 NEGATIVE  NEGATIVE Final    Leukocyte Esterase, Urine 08/26/2024 500 Ritu/µL (A)  NEGATIVE Final    Extra Tube 08/26/2024 Hold for add-ons.   Final    Auto resulted.    WBC, Urine 08/26/2024 >50 (A)  1-5, NONE /HPF Final    WBC Clumps, Urine 08/26/2024 OCCASIONAL  Reference range not established. /HPF Final    RBC, Urine 08/26/2024 6-10 (A)  NONE, 1-2, 3-5 /HPF Final    Squamous Epithelial Cells, Urine 08/26/2024 1-9 (SPARSE)  Reference range not established. /HPF Final    Bacteria, Urine 08/26/2024 1+ (A)  NONE SEEN /HPF Final    Mucus, Urine 08/26/2024 FEW  Reference range not established. /LPF Final    Urine  Culture 08/26/2024 >100,000 Escherichia coli (A)   Final       XR shoulder left 1 view  Narrative: Interpreted By:  Yaw Akbar,   STUDY:  XR SHOULDER LEFT 1 VIEW;  4/17/2024 10:01 am      INDICATION:  Signs/Symptoms:axiallry view left shoulder pain.      COMPARISON:  None.      ACCESSION NUMBER(S):  [id number]      ORDERING CLINICIAN:  LESLIE GROSS      FINDINGS:  Single limited axillary view of the left shoulder reveals no evidence  of a dislocation. No acute fractures are identified, within the  limits of the single view. Degenerative spurring noted.      Impression: Degenerative changes. Within the limits of the single view, no  evidence of a fracture or dislocation.      MACRO:  None.      Signed by: Yaw Akbar 4/18/2024 2:25 PM  Dictation workstation:   [id number]       Review of Systems   Constitutional:  Negative for activity change, chills, fatigue and fever.   HENT:  Negative for congestion, rhinorrhea, sinus pressure, sinus pain and sore throat.    Eyes: Negative.    Respiratory:  Negative for cough, chest tightness, shortness of breath and wheezing.    Cardiovascular:  Negative for chest pain and palpitations.   Gastrointestinal:  Negative for abdominal distention, abdominal pain, constipation, diarrhea, nausea and vomiting.   Endocrine: Negative.    Genitourinary:  Negative for dysuria.   Musculoskeletal:  Positive for arthralgias.   Skin: Negative.    Allergic/Immunologic: Negative.    Neurological:  Negative for dizziness, syncope, weakness, light-headedness, numbness and headaches.   Hematological: Negative.    Psychiatric/Behavioral: Negative.     All other systems reviewed and are negative.      Objective   Visit Vitals  /60   Pulse 74   Temp 36.2 °C (97.1 °F)   Wt 88.5 kg (195 lb)   SpO2 98%   BMI 34.54 kg/m²   OB Status Postmenopausal   Smoking Status Never   BSA 1.98 m²      Physical Exam  Vitals and nursing note reviewed.   Constitutional:       General: She is not in acute  distress.     Appearance: Normal appearance. She is obese. She is not ill-appearing.   HENT:      Head: Normocephalic and atraumatic.      Right Ear: Tympanic membrane, ear canal and external ear normal.      Left Ear: Tympanic membrane, ear canal and external ear normal.      Nose: Nose normal.      Mouth/Throat:      Mouth: Mucous membranes are moist.      Pharynx: Oropharynx is clear.   Eyes:      Pupils: Pupils are equal, round, and reactive to light.   Cardiovascular:      Rate and Rhythm: Normal rate and regular rhythm.      Pulses: Normal pulses.      Heart sounds: Normal heart sounds. No murmur heard.  Pulmonary:      Effort: Pulmonary effort is normal. No respiratory distress.      Breath sounds: Normal breath sounds. No wheezing.   Abdominal:      General: Bowel sounds are normal. There is no distension.      Palpations: Abdomen is soft.      Tenderness: There is no abdominal tenderness.   Musculoskeletal:         General: No tenderness. Normal range of motion.      Cervical back: Normal range of motion and neck supple.      Right lower leg: No edema.      Left lower leg: No edema.   Skin:     General: Skin is warm and dry.      Capillary Refill: Capillary refill takes less than 2 seconds.      Coloration: Skin is not jaundiced.   Neurological:      General: No focal deficit present.      Mental Status: She is alert and oriented to person, place, and time.      Cranial Nerves: Cranial nerves 2-12 are intact.      Sensory: Sensation is intact.      Motor: Motor function is intact. No weakness.      Coordination: Coordination is intact.   Psychiatric:         Mood and Affect: Mood normal.         Behavior: Behavior normal.         Thought Content: Thought content normal.         Judgment: Judgment normal.       Assessment/Plan   Marina was seen today for hypertension.  Diagnoses and all orders for this visit:  Essential hypertension (Primary)  Mixed hyperlipidemia  Type 2 diabetes mellitus with hyperglycemia,  without long-term current use of insulin  Other orders  -     Follow Up In Primary Care - Established     # Diabetes, controlled  -Continue Trulicity 3 mg weekly, Actos 30 mg daily  -Low flat/cholesterol, low sodium, carbohydrate controlled diet  -Exercise as tolerated 150 minutes/week, gradually increase activity  -Weight loss  -Regular follow-up with ophthalmology and podiatry  # Hypertension, controlled  -Continue HCTZ 25 mg daily, losartan 100 mg daily and carvedilol 6.25 mg twice a day  -Low fat/cholesterol, low sodium, low carbohydrate diet  -Exercise as tolerated 150 minutes/week, increase activity slowly  -Weight loss  # Obesity, BMI 33  -Avoid sweets and sugary drinks  -Drink plenty of water  -Avoid processed foods and refined foods  -Eat fruits, vegetables, lean protein, whole grains  -Increase your activity level  # Hyperlipidemia  -Intolerant of statins  -Continue Zetia  -Low fat/low cholesterol diet  -Eat more fresh foods  -Avoid processed/prepackaged/fast foods  -Gradually increase physical activity  -Weight loss  -Follow-up with cardiology  # Thyroid nodule  -Follow-up with ENT  # Possible pituitary microadenoma  -Due for 6-month follow-up MRI    Follow-up with Dr. JULIAN Campos in 3 months for Medicare physical and as needed

## 2024-09-30 ENCOUNTER — APPOINTMENT (OUTPATIENT)
Dept: PRIMARY CARE | Facility: CLINIC | Age: 69
End: 2024-09-30
Payer: MEDICARE

## 2024-10-02 ENCOUNTER — LAB (OUTPATIENT)
Dept: LAB | Facility: LAB | Age: 69
End: 2024-10-02
Payer: MEDICARE

## 2024-10-02 ENCOUNTER — APPOINTMENT (OUTPATIENT)
Dept: PRIMARY CARE | Facility: CLINIC | Age: 69
End: 2024-10-02
Payer: MEDICARE

## 2024-10-02 VITALS
TEMPERATURE: 97.1 F | DIASTOLIC BLOOD PRESSURE: 60 MMHG | WEIGHT: 195 LBS | HEART RATE: 74 BPM | SYSTOLIC BLOOD PRESSURE: 122 MMHG | OXYGEN SATURATION: 98 % | BODY MASS INDEX: 34.54 KG/M2

## 2024-10-02 DIAGNOSIS — I10 ESSENTIAL HYPERTENSION: Primary | ICD-10-CM

## 2024-10-02 DIAGNOSIS — E11.65 TYPE 2 DIABETES MELLITUS WITH HYPERGLYCEMIA, WITHOUT LONG-TERM CURRENT USE OF INSULIN: ICD-10-CM

## 2024-10-02 DIAGNOSIS — E78.2 MIXED HYPERLIPIDEMIA: ICD-10-CM

## 2024-10-02 DIAGNOSIS — D69.6 LOW PLATELET COUNT (CMS-HCC): ICD-10-CM

## 2024-10-02 LAB
BASOPHILS # BLD AUTO: 0.03 X10*3/UL (ref 0–0.1)
BASOPHILS NFR BLD AUTO: 0.4 %
EOSINOPHIL # BLD AUTO: 0.14 X10*3/UL (ref 0–0.7)
EOSINOPHIL NFR BLD AUTO: 1.7 %
ERYTHROCYTE [DISTWIDTH] IN BLOOD BY AUTOMATED COUNT: 12.7 % (ref 11.5–14.5)
HCT VFR BLD AUTO: 39.1 % (ref 36–46)
HGB BLD-MCNC: 12.7 G/DL (ref 12–16)
IMM GRANULOCYTES # BLD AUTO: 0.03 X10*3/UL (ref 0–0.7)
IMM GRANULOCYTES NFR BLD AUTO: 0.4 % (ref 0–0.9)
LYMPHOCYTES # BLD AUTO: 1.76 X10*3/UL (ref 1.2–4.8)
LYMPHOCYTES NFR BLD AUTO: 21.5 %
MCH RBC QN AUTO: 31.6 PG (ref 26–34)
MCHC RBC AUTO-ENTMCNC: 32.5 G/DL (ref 32–36)
MCV RBC AUTO: 97 FL (ref 80–100)
MONOCYTES # BLD AUTO: 0.77 X10*3/UL (ref 0.1–1)
MONOCYTES NFR BLD AUTO: 9.4 %
NEUTROPHILS # BLD AUTO: 5.46 X10*3/UL (ref 1.2–7.7)
NEUTROPHILS NFR BLD AUTO: 66.6 %
NRBC BLD-RTO: 0 /100 WBCS (ref 0–0)
PLATELET # BLD AUTO: 164 X10*3/UL (ref 150–450)
RBC # BLD AUTO: 4.02 X10*6/UL (ref 4–5.2)
WBC # BLD AUTO: 8.2 X10*3/UL (ref 4.4–11.3)

## 2024-10-02 PROCEDURE — 1160F RVW MEDS BY RX/DR IN RCRD: CPT | Performed by: NURSE PRACTITIONER

## 2024-10-02 PROCEDURE — 36415 COLL VENOUS BLD VENIPUNCTURE: CPT

## 2024-10-02 PROCEDURE — 3074F SYST BP LT 130 MM HG: CPT | Performed by: NURSE PRACTITIONER

## 2024-10-02 PROCEDURE — 3078F DIAST BP <80 MM HG: CPT | Performed by: NURSE PRACTITIONER

## 2024-10-02 PROCEDURE — 99213 OFFICE O/P EST LOW 20 MIN: CPT | Performed by: NURSE PRACTITIONER

## 2024-10-02 PROCEDURE — 4010F ACE/ARB THERAPY RXD/TAKEN: CPT | Performed by: NURSE PRACTITIONER

## 2024-10-02 PROCEDURE — 3049F LDL-C 100-129 MG/DL: CPT | Performed by: NURSE PRACTITIONER

## 2024-10-02 PROCEDURE — 1036F TOBACCO NON-USER: CPT | Performed by: NURSE PRACTITIONER

## 2024-10-02 PROCEDURE — 3062F POS MACROALBUMINURIA REV: CPT | Performed by: NURSE PRACTITIONER

## 2024-10-02 PROCEDURE — 1159F MED LIST DOCD IN RCRD: CPT | Performed by: NURSE PRACTITIONER

## 2024-10-15 ENCOUNTER — APPOINTMENT (OUTPATIENT)
Dept: OBSTETRICS AND GYNECOLOGY | Facility: CLINIC | Age: 69
End: 2024-10-15
Payer: MEDICARE

## 2025-01-07 ENCOUNTER — APPOINTMENT (OUTPATIENT)
Dept: PRIMARY CARE | Facility: CLINIC | Age: 70
End: 2025-01-07
Payer: MEDICARE

## 2025-01-13 ENCOUNTER — APPOINTMENT (OUTPATIENT)
Dept: PRIMARY CARE | Facility: CLINIC | Age: 70
End: 2025-01-13
Payer: MEDICARE

## 2025-03-25 DIAGNOSIS — I10 ESSENTIAL HYPERTENSION: ICD-10-CM

## 2025-03-25 DIAGNOSIS — E11.65 TYPE 2 DIABETES MELLITUS WITH HYPERGLYCEMIA, WITHOUT LONG-TERM CURRENT USE OF INSULIN: ICD-10-CM

## 2025-03-25 RX ORDER — CARVEDILOL 6.25 MG/1
6.25 TABLET ORAL
Qty: 180 TABLET | Refills: 1 | Status: SHIPPED | OUTPATIENT
Start: 2025-03-25

## 2025-03-25 RX ORDER — DULAGLUTIDE 3 MG/.5ML
INJECTION, SOLUTION SUBCUTANEOUS
Qty: 6 ML | Refills: 1 | Status: SHIPPED | OUTPATIENT
Start: 2025-03-25

## 2025-03-28 ENCOUNTER — OFFICE VISIT (OUTPATIENT)
Dept: OBSTETRICS AND GYNECOLOGY | Facility: CLINIC | Age: 70
End: 2025-03-28
Payer: MEDICARE

## 2025-03-28 VITALS
WEIGHT: 194.6 LBS | BODY MASS INDEX: 34.48 KG/M2 | DIASTOLIC BLOOD PRESSURE: 83 MMHG | SYSTOLIC BLOOD PRESSURE: 197 MMHG | HEART RATE: 75 BPM | HEIGHT: 63 IN

## 2025-03-28 DIAGNOSIS — R32 URINARY INCONTINENCE, UNSPECIFIED TYPE: Primary | ICD-10-CM

## 2025-03-28 LAB
POC APPEARANCE, URINE: CLEAR
POC BILIRUBIN, URINE: NEGATIVE
POC BLOOD, URINE: NEGATIVE
POC COLOR, URINE: YELLOW
POC GLUCOSE, URINE: NEGATIVE MG/DL
POC KETONES, URINE: NEGATIVE MG/DL
POC LEUKOCYTES, URINE: ABNORMAL
POC NITRITE,URINE: NEGATIVE
POC PH, URINE: 6 PH
POC PROTEIN, URINE: NEGATIVE MG/DL
POC SPECIFIC GRAVITY, URINE: 1.02
POC UROBILINOGEN, URINE: 0.2 EU/DL

## 2025-03-28 PROCEDURE — 99203 OFFICE O/P NEW LOW 30 MIN: CPT | Performed by: NURSE PRACTITIONER

## 2025-03-28 PROCEDURE — 4010F ACE/ARB THERAPY RXD/TAKEN: CPT | Performed by: NURSE PRACTITIONER

## 2025-03-28 PROCEDURE — 1126F AMNT PAIN NOTED NONE PRSNT: CPT | Performed by: NURSE PRACTITIONER

## 2025-03-28 PROCEDURE — 51798 US URINE CAPACITY MEASURE: CPT | Performed by: NURSE PRACTITIONER

## 2025-03-28 PROCEDURE — 3008F BODY MASS INDEX DOCD: CPT | Performed by: NURSE PRACTITIONER

## 2025-03-28 PROCEDURE — 3077F SYST BP >= 140 MM HG: CPT | Performed by: NURSE PRACTITIONER

## 2025-03-28 PROCEDURE — 81003 URINALYSIS AUTO W/O SCOPE: CPT | Performed by: NURSE PRACTITIONER

## 2025-03-28 PROCEDURE — 3079F DIAST BP 80-89 MM HG: CPT | Performed by: NURSE PRACTITIONER

## 2025-03-28 PROCEDURE — 1159F MED LIST DOCD IN RCRD: CPT | Performed by: NURSE PRACTITIONER

## 2025-03-28 ASSESSMENT — PAIN SCALES - GENERAL: PAINLEVEL_OUTOF10: 0-NO PAIN

## 2025-03-28 NOTE — PROGRESS NOTES
Referring Physician: Erika Tong, APR*     General medical background: {Hospitalizations:599979456}    Obstetric/Gynecologic History:  Marina Lopez has a history of : ***. Para: ***. There is a history of {Number of  Sections:221417409} The weight of the largest baby was ***. During the delivery, the patient experienced {During the delivery, did you ever have:638653720}. Patient {Sex life:306584205}. Patient {DENIES/REPORTS EC:55580} pain with intercourse. Patient {DENIES/REPORTS EC:22432} pain with deep penetration. {DENIES/REPORTS EC:67586} pain with external stimulation.    Past Evaluation and Treatment::  Past evaluation includes: {Urogyn past eval:799130848}  Past treatment includes: {Past treatment:728178711}    Review of Systems     HPI    Urinary Incontinence Questions:  Looking back, how long do you think that you have been affected by your urinary complaints? ***  Have you seen a physician or medical provider for this?{YES/NO:52095}  Have you ever been prescribed any medication for this? {YES/NO:70988}  Have you ever taken any medication for this? {YES/NO:70020}  How many different medications have you tried or been prescribed? {Number:983519989}  How much symptom relief do you, or did you, have taking these medications?{Symptom relief scale:242675430}  If you stopped taking medication(s), what were the reasons why you stopped? (Check all that apply): {Reasons for stopping medications:0285913786}  How frustrated are you with your bladder symptoms? {Frustration level:4403246906}  What are your goals of therapy? {Goals of therapy:9387992326}    Testing results:  {PVR:64249482}  : ***  {UA:33665454}  Laboratory:   Urine dipstick shows {Findings; lab urine dip:81124}.      History:  Stress Symptoms:   {Stress symptoms:26991577}  Score:  Urge Symptoms:   {Urge symptoms:36106207}  Score:  Vaginal Symptoms:   {Vaginal symptoms:39312446}  Score:  Rectal Symptoms:   {Rectal  symptoms:34916833}      OBGyn Exam     Assessment and Plan    No problem-specific Assessment & Plan notes found for this encounter.      Bettye Silva, APRN-CNP    unspecified type. Comorbidities include: HTN, HLD, T2DM.    Diagnosis List:  #1 Urinary incontinence, unspecified type    Plan:  1. Urinary incontinence, unspecified type  - POCT UA automated manually resulted.  - Discussed OAB treatment options such as lifestyle changes, PFPT, medications, intradetrusor Botox injections, or SNM.   - We discussed that PFPT may help with bladder/pelvic floor restrengthening exercises with an overall goal to better control the bladder and improve urinary symptoms. PFPT includes attending sessions with a specialized licensed female pelvic floor physical therapist who does external with internal vaginal work to ensure she is doing the correct exercises to obtain the most benefit of physical therapy. We reviewed the importance of continuing these home exercises to receive maximum benefit from PFPT. They also teach mind over bladder strategies/urge suppression techniques to reduce the intensity of the urge to void.   - We discussed that with starting an OAB medication the goal would be to allow the detrusor muscle around the bladder to relax and prevent the muscles from spasm/squeezing too frequently to allow the bladder to store more urine which reduces the urge, frequency, and incontinent episodes.   - We briefly discussed third-line therapies we offer for OAB including intradetrusor Botox injection in which we inject Botox to the muscle the controls the bladder to allow it to relax to provider OAB sx relief for up to 3-12 months but there is a possibility of urinary retention in which she would need to be amenable to learning how to CIC until the retention resolves over time as the effects of Botox wears off, and sacral neuromodulation where we do a PNE trial placing a temporary lead into the S3 sacral nerve that goes to the bowel/bladder in office and the patient evaluates after 5-7 days if they get 50% improvement in her urinary symptoms we would proceed with stage 2 of permanently  placing the SNM device in the OR.   - Patient is amenable to pursuing trial of Gemtesa as it does not affect blood pressure or have anticholinergic side effects. Samples provided.     Follow up in 4-6 weeks with OTIS Soto.      Scribe Attestation  By signing my name below, IKeisha Scribe, attest that this documentation has been prepared under the direction and in the presence of OTIS Soto on 03/28/2025 at 7:56 PM.   I, OTIS Soto, personally performed the services described in this documentation which was scribed virtually and I confirm that it is both accurate and complete.     OTIS Soto

## 2025-03-31 ASSESSMENT — ENCOUNTER SYMPTOMS
HEMATOLOGIC/LYMPHATIC NEGATIVE: 1
CARDIOVASCULAR NEGATIVE: 1
MUSCULOSKELETAL NEGATIVE: 1
NEUROLOGICAL NEGATIVE: 1
CONSTITUTIONAL NEGATIVE: 1
GASTROINTESTINAL NEGATIVE: 1
PSYCHIATRIC NEGATIVE: 1
EYES NEGATIVE: 1
ENDOCRINE NEGATIVE: 1
ALLERGIC/IMMUNOLOGIC NEGATIVE: 1
RESPIRATORY NEGATIVE: 1

## 2025-04-28 ENCOUNTER — APPOINTMENT (OUTPATIENT)
Dept: PRIMARY CARE | Facility: CLINIC | Age: 70
End: 2025-04-28
Payer: MEDICARE

## 2025-04-28 VITALS
SYSTOLIC BLOOD PRESSURE: 164 MMHG | DIASTOLIC BLOOD PRESSURE: 80 MMHG | WEIGHT: 192.4 LBS | HEART RATE: 76 BPM | HEIGHT: 63 IN | OXYGEN SATURATION: 98 % | BODY MASS INDEX: 34.09 KG/M2

## 2025-04-28 DIAGNOSIS — Z13.220 ENCOUNTER FOR LIPID SCREENING FOR CARDIOVASCULAR DISEASE: ICD-10-CM

## 2025-04-28 DIAGNOSIS — Z78.9 STATIN INTOLERANCE: ICD-10-CM

## 2025-04-28 DIAGNOSIS — Z12.31 SCREENING MAMMOGRAM FOR BREAST CANCER: ICD-10-CM

## 2025-04-28 DIAGNOSIS — Z13.1 SCREENING FOR DIABETES MELLITUS: ICD-10-CM

## 2025-04-28 DIAGNOSIS — Z13.21 ENCOUNTER FOR VITAMIN DEFICIENCY SCREENING: ICD-10-CM

## 2025-04-28 DIAGNOSIS — R73.09 BLOOD GLUCOSE ABNORMAL: ICD-10-CM

## 2025-04-28 DIAGNOSIS — Z12.12 SCREENING FOR COLORECTAL CANCER: ICD-10-CM

## 2025-04-28 DIAGNOSIS — Z13.6 ENCOUNTER FOR LIPID SCREENING FOR CARDIOVASCULAR DISEASE: ICD-10-CM

## 2025-04-28 DIAGNOSIS — Z13.29 SCREENING FOR THYROID DISORDER: ICD-10-CM

## 2025-04-28 DIAGNOSIS — E55.9 VITAMIN D DEFICIENCY: ICD-10-CM

## 2025-04-28 DIAGNOSIS — R14.2 BELCHING SYMPTOM: ICD-10-CM

## 2025-04-28 DIAGNOSIS — Z13.220 SCREENING FOR CHOLESTEROL LEVEL: ICD-10-CM

## 2025-04-28 DIAGNOSIS — Z00.00 ROUTINE GENERAL MEDICAL EXAMINATION AT HEALTH CARE FACILITY: Primary | ICD-10-CM

## 2025-04-28 DIAGNOSIS — R01.1 CARDIAC MURMUR: ICD-10-CM

## 2025-04-28 DIAGNOSIS — Z79.899 ON LONG TERM DRUG THERAPY: ICD-10-CM

## 2025-04-28 DIAGNOSIS — Z00.00 ROUTINE ADULT HEALTH MAINTENANCE: ICD-10-CM

## 2025-04-28 DIAGNOSIS — I10 ESSENTIAL HYPERTENSION: ICD-10-CM

## 2025-04-28 DIAGNOSIS — Z12.11 SCREENING FOR COLORECTAL CANCER: ICD-10-CM

## 2025-04-28 DIAGNOSIS — E78.2 MIXED HYPERLIPIDEMIA: ICD-10-CM

## 2025-04-28 DIAGNOSIS — E04.1 THYROID NODULE: ICD-10-CM

## 2025-04-28 DIAGNOSIS — E11.65 TYPE 2 DIABETES MELLITUS WITH HYPERGLYCEMIA, WITHOUT LONG-TERM CURRENT USE OF INSULIN: ICD-10-CM

## 2025-04-28 DIAGNOSIS — M25.50 POLYARTHRALGIA: ICD-10-CM

## 2025-04-28 PROBLEM — T46.6X5A STATIN MYOPATHY: Status: RESOLVED | Noted: 2023-11-22 | Resolved: 2025-04-28

## 2025-04-28 PROBLEM — G25.81 RESTLESS LEG: Status: RESOLVED | Noted: 2023-08-29 | Resolved: 2025-04-28

## 2025-04-28 PROBLEM — M43.16 SPONDYLOLISTHESIS AT L4-L5 LEVEL: Status: RESOLVED | Noted: 2023-08-29 | Resolved: 2025-04-28

## 2025-04-28 PROBLEM — G72.0 STATIN MYOPATHY: Status: RESOLVED | Noted: 2023-11-22 | Resolved: 2025-04-28

## 2025-04-28 PROBLEM — M75.102 TEAR OF LEFT ROTATOR CUFF: Status: RESOLVED | Noted: 2024-04-29 | Resolved: 2025-04-28

## 2025-04-28 LAB — POC HEMOGLOBIN A1C: 6.5 % (ref 4.2–6.5)

## 2025-04-28 PROCEDURE — 3044F HG A1C LEVEL LT 7.0%: CPT | Performed by: NURSE PRACTITIONER

## 2025-04-28 PROCEDURE — 83036 HEMOGLOBIN GLYCOSYLATED A1C: CPT | Performed by: NURSE PRACTITIONER

## 2025-04-28 PROCEDURE — 1036F TOBACCO NON-USER: CPT | Performed by: NURSE PRACTITIONER

## 2025-04-28 PROCEDURE — 1160F RVW MEDS BY RX/DR IN RCRD: CPT | Performed by: NURSE PRACTITIONER

## 2025-04-28 PROCEDURE — 1170F FXNL STATUS ASSESSED: CPT | Performed by: NURSE PRACTITIONER

## 2025-04-28 PROCEDURE — G0439 PPPS, SUBSEQ VISIT: HCPCS | Performed by: NURSE PRACTITIONER

## 2025-04-28 PROCEDURE — 3079F DIAST BP 80-89 MM HG: CPT | Performed by: NURSE PRACTITIONER

## 2025-04-28 PROCEDURE — 3008F BODY MASS INDEX DOCD: CPT | Performed by: NURSE PRACTITIONER

## 2025-04-28 PROCEDURE — 3077F SYST BP >= 140 MM HG: CPT | Performed by: NURSE PRACTITIONER

## 2025-04-28 PROCEDURE — 1158F ADVNC CARE PLAN TLK DOCD: CPT | Performed by: NURSE PRACTITIONER

## 2025-04-28 PROCEDURE — 1123F ACP DISCUSS/DSCN MKR DOCD: CPT | Performed by: NURSE PRACTITIONER

## 2025-04-28 PROCEDURE — 1159F MED LIST DOCD IN RCRD: CPT | Performed by: NURSE PRACTITIONER

## 2025-04-28 PROCEDURE — 4010F ACE/ARB THERAPY RXD/TAKEN: CPT | Performed by: NURSE PRACTITIONER

## 2025-04-28 PROCEDURE — 99214 OFFICE O/P EST MOD 30 MIN: CPT | Performed by: NURSE PRACTITIONER

## 2025-04-28 RX ORDER — EZETIMIBE 10 MG/1
10 TABLET ORAL DAILY
Qty: 90 TABLET | Refills: 2 | Status: SHIPPED | OUTPATIENT
Start: 2025-04-28

## 2025-04-28 RX ORDER — DULAGLUTIDE 4.5 MG/.5ML
4.5 INJECTION, SOLUTION SUBCUTANEOUS WEEKLY
Qty: 2 ML | Refills: 5 | Status: SHIPPED | OUTPATIENT
Start: 2025-04-28

## 2025-04-28 RX ORDER — PIOGLITAZONE 30 MG/1
30 TABLET ORAL DAILY
Qty: 90 TABLET | Refills: 2 | Status: SHIPPED | OUTPATIENT
Start: 2025-04-28

## 2025-04-28 RX ORDER — VIBEGRON 75 MG/1
75 TABLET, FILM COATED ORAL DAILY
COMMUNITY

## 2025-04-28 RX ORDER — PANTOPRAZOLE SODIUM 40 MG/1
40 TABLET, DELAYED RELEASE ORAL DAILY
Qty: 90 TABLET | Refills: 0 | Status: SHIPPED | OUTPATIENT
Start: 2025-04-28 | End: 2026-04-28

## 2025-04-28 RX ORDER — HYDROCHLOROTHIAZIDE 25 MG/1
25 TABLET ORAL DAILY
Qty: 90 TABLET | Refills: 2 | Status: SHIPPED | OUTPATIENT
Start: 2025-04-28

## 2025-04-28 ASSESSMENT — ENCOUNTER SYMPTOMS
WOUND: 0
ADENOPATHY: 0
ABDOMINAL PAIN: 0
NAUSEA: 0
DIZZINESS: 0
ABDOMINAL DISTENTION: 0
WEAKNESS: 0
ARTHRALGIAS: 1
SHORTNESS OF BREATH: 0
ROS GI COMMENTS: SEE HPI
LOSS OF SENSATION IN FEET: 0
BACK PAIN: 0
CONSTIPATION: 0
EYE PAIN: 0
PALPITATIONS: 0
HEADACHES: 0
WHEEZING: 0
MYALGIAS: 0
JOINT SWELLING: 0
COLOR CHANGE: 0
DEPRESSION: 0
DIARRHEA: 0
CHILLS: 0
OCCASIONAL FEELINGS OF UNSTEADINESS: 0
DIFFICULTY URINATING: 0
BRUISES/BLEEDS EASILY: 1
COUGH: 0
SEIZURES: 0
TROUBLE SWALLOWING: 0
FATIGUE: 0
FEVER: 0

## 2025-04-28 ASSESSMENT — ACTIVITIES OF DAILY LIVING (ADL)
DRESSING: INDEPENDENT
TAKING_MEDICATION: INDEPENDENT
BATHING: INDEPENDENT
GROCERY_SHOPPING: INDEPENDENT
DOING_HOUSEWORK: INDEPENDENT
MANAGING_FINANCES: INDEPENDENT

## 2025-04-28 ASSESSMENT — PATIENT HEALTH QUESTIONNAIRE - PHQ9
2. FEELING DOWN, DEPRESSED OR HOPELESS: NOT AT ALL
SUM OF ALL RESPONSES TO PHQ9 QUESTIONS 1 AND 2: 0
1. LITTLE INTEREST OR PLEASURE IN DOING THINGS: NOT AT ALL

## 2025-04-28 NOTE — ASSESSMENT & PLAN NOTE
Patient continues on Zetia daily without issue as she has a statin intolerance.  Will continue to monitor patient's fasting cholesterol routinely for surveillance purposes

## 2025-04-28 NOTE — ASSESSMENT & PLAN NOTE
Orders placed today for screening blood work to be completed.  Will continue to monitor as appropriate.  -Orders placed today for screening mammogram and colonoscopy/EGD  -Most recent bone density scan completed in 2023 and was within normal limits.  Based upon degeneration of spine, we will repeat scan in 5 years (2025)   Alert and oriented, no focal deficits, no motor or sensory deficits.

## 2025-04-28 NOTE — ASSESSMENT & PLAN NOTE
"Most recent echocardiogram on file was completed in April, 2023 and showed \"CONCLUSIONS: 1. Left ventricular systolic function is normal with a 60-65% estimated ejection fraction. 2. Spectral Doppler shows an impaired relaxation pattern of left ventricular diastolic filling. 3. Mild to moderate aortic valve stenosis\".  She is to maintain routine follow-up with her cardiologist for continued evaluation and treatment recommendations  "

## 2025-04-28 NOTE — ASSESSMENT & PLAN NOTE
Continue with comfort measures and supportive care.  Provider to be notified for any persistent/worsening joint/pain concerns.  Consider OP PT and/or orthopedics follow-up as appropriate
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
## 2025-04-28 NOTE — ASSESSMENT & PLAN NOTE
Lab Results   Component Value Date    HGBA1C 6.5 04/28/2025     Patient requesting Trulicity be increased if she would like better glycemic control in addition to helping with weight loss.  Patient encouraged to monitor her blood glucose levels routinely and notify provider for any persistent elevations or episodes of hypoglycemia.  Continue to monitor patient's hemoglobin A1c routinely for surveillance purposes

## 2025-04-28 NOTE — ASSESSMENT & PLAN NOTE
Patient attributes elevated blood pressure reading today to having running out of medications.  Discussed blood pressures at home which appear to be within normal limits on a regular basis.  Patient to continue medications daily as prescribed.  She is to notify provider for any persistent elevations or associated cardiac concerns.  She is also to maintain routine follow-up with her cardiologist, Dr. Cedillo, for continued evaluation and treatment recommendations.

## 2025-04-28 NOTE — ASSESSMENT & PLAN NOTE
Referral placed to follow-up with Dr. Chandler, general surgery for EGD and colonoscopy.  Will also initiate trial of PPI due to unknown etiology.  Patient instructed to report to the emergency department immediately for any persistent issues with belching blood, hematic emesis or other red flag symptoms.  Patient voiced understanding at this time had no additional questions

## 2025-04-28 NOTE — PATIENT INSTRUCTIONS
Please arrange for routine follow up in 6 months. You may schedule on-line through "[employer] or call our office at [phone].      Orders are in place for you to complete imaging for additional assessment purposes.  You can have this completed at [facility].  Please contact the radiology department there to schedule.  The number is [phone]

## 2025-04-29 LAB
25(OH)D3+25(OH)D2 SERPL-MCNC: 33 NG/ML (ref 30–100)
ALBUMIN SERPL-MCNC: 4.1 G/DL (ref 3.6–5.1)
ALBUMIN/CREAT UR: 49 MG/G CREAT
ALP SERPL-CCNC: 58 U/L (ref 37–153)
ALT SERPL-CCNC: 11 U/L (ref 6–29)
ANION GAP SERPL CALCULATED.4IONS-SCNC: 7 MMOL/L (CALC) (ref 7–17)
AST SERPL-CCNC: 16 U/L (ref 10–35)
BASOPHILS # BLD AUTO: 53 CELLS/UL (ref 0–200)
BASOPHILS NFR BLD AUTO: 0.6 %
BILIRUB SERPL-MCNC: 0.6 MG/DL (ref 0.2–1.2)
BUN SERPL-MCNC: 16 MG/DL (ref 7–25)
CALCIUM SERPL-MCNC: 9.5 MG/DL (ref 8.6–10.4)
CHLORIDE SERPL-SCNC: 103 MMOL/L (ref 98–110)
CHOLEST SERPL-MCNC: 212 MG/DL
CHOLEST/HDLC SERPL: 6.2 (CALC)
CO2 SERPL-SCNC: 29 MMOL/L (ref 20–32)
CREAT SERPL-MCNC: 0.88 MG/DL (ref 0.5–1.05)
CREAT UR-MCNC: 37 MG/DL (ref 20–275)
EGFRCR SERPLBLD CKD-EPI 2021: 71 ML/MIN/1.73M2
EOSINOPHIL # BLD AUTO: 194 CELLS/UL (ref 15–500)
EOSINOPHIL NFR BLD AUTO: 2.2 %
ERYTHROCYTE [DISTWIDTH] IN BLOOD BY AUTOMATED COUNT: 12.2 % (ref 11–15)
GLUCOSE SERPL-MCNC: 110 MG/DL (ref 65–99)
HCT VFR BLD AUTO: 38.8 % (ref 35–45)
HDLC SERPL-MCNC: 34 MG/DL
HGB BLD-MCNC: 12.9 G/DL (ref 11.7–15.5)
LDLC SERPL CALC-MCNC: 155 MG/DL (CALC)
LYMPHOCYTES # BLD AUTO: 2033 CELLS/UL (ref 850–3900)
LYMPHOCYTES NFR BLD AUTO: 23.1 %
MCH RBC QN AUTO: 31.5 PG (ref 27–33)
MCHC RBC AUTO-ENTMCNC: 33.2 G/DL (ref 32–36)
MCV RBC AUTO: 94.6 FL (ref 80–100)
MICROALBUMIN UR-MCNC: 1.8 MG/DL
MONOCYTES # BLD AUTO: 642 CELLS/UL (ref 200–950)
MONOCYTES NFR BLD AUTO: 7.3 %
NEUTROPHILS # BLD AUTO: 5878 CELLS/UL (ref 1500–7800)
NEUTROPHILS NFR BLD AUTO: 66.8 %
NONHDLC SERPL-MCNC: 178 MG/DL (CALC)
PLATELET # BLD AUTO: 171 THOUSAND/UL (ref 140–400)
PMV BLD REES-ECKER: 10.8 FL (ref 7.5–12.5)
POTASSIUM SERPL-SCNC: 4.1 MMOL/L (ref 3.5–5.3)
PROT SERPL-MCNC: 7.4 G/DL (ref 6.1–8.1)
RBC # BLD AUTO: 4.1 MILLION/UL (ref 3.8–5.1)
SODIUM SERPL-SCNC: 139 MMOL/L (ref 135–146)
TRIGL SERPL-MCNC: 114 MG/DL
TSH SERPL-ACNC: 1.11 MIU/L (ref 0.4–4.5)
WBC # BLD AUTO: 8.8 THOUSAND/UL (ref 3.8–10.8)

## 2025-05-09 ENCOUNTER — APPOINTMENT (OUTPATIENT)
Dept: OBSTETRICS AND GYNECOLOGY | Facility: CLINIC | Age: 70
End: 2025-05-09
Payer: MEDICARE

## 2025-05-09 VITALS
SYSTOLIC BLOOD PRESSURE: 127 MMHG | HEIGHT: 63 IN | BODY MASS INDEX: 33.63 KG/M2 | HEART RATE: 69 BPM | DIASTOLIC BLOOD PRESSURE: 77 MMHG | WEIGHT: 189.8 LBS

## 2025-05-09 DIAGNOSIS — R32 URINARY INCONTINENCE, UNSPECIFIED TYPE: ICD-10-CM

## 2025-05-09 LAB
POC APPEARANCE, URINE: CLEAR
POC BILIRUBIN, URINE: NEGATIVE
POC BLOOD, URINE: NEGATIVE
POC COLOR, URINE: YELLOW
POC GLUCOSE, URINE: NEGATIVE MG/DL
POC KETONES, URINE: NEGATIVE MG/DL
POC LEUKOCYTES, URINE: ABNORMAL
POC NITRITE,URINE: NEGATIVE
POC PH, URINE: 5.5 PH
POC PROTEIN, URINE: NEGATIVE MG/DL
POC SPECIFIC GRAVITY, URINE: 1.01
POC UROBILINOGEN, URINE: 0.2 EU/DL

## 2025-05-09 PROCEDURE — 1126F AMNT PAIN NOTED NONE PRSNT: CPT | Performed by: NURSE PRACTITIONER

## 2025-05-09 PROCEDURE — 99212 OFFICE O/P EST SF 10 MIN: CPT | Performed by: NURSE PRACTITIONER

## 2025-05-09 PROCEDURE — 81003 URINALYSIS AUTO W/O SCOPE: CPT | Performed by: NURSE PRACTITIONER

## 2025-05-09 PROCEDURE — 3008F BODY MASS INDEX DOCD: CPT | Performed by: NURSE PRACTITIONER

## 2025-05-09 PROCEDURE — 51798 US URINE CAPACITY MEASURE: CPT | Performed by: NURSE PRACTITIONER

## 2025-05-09 PROCEDURE — 3078F DIAST BP <80 MM HG: CPT | Performed by: NURSE PRACTITIONER

## 2025-05-09 PROCEDURE — 3044F HG A1C LEVEL LT 7.0%: CPT | Performed by: NURSE PRACTITIONER

## 2025-05-09 PROCEDURE — 4010F ACE/ARB THERAPY RXD/TAKEN: CPT | Performed by: NURSE PRACTITIONER

## 2025-05-09 PROCEDURE — 3074F SYST BP LT 130 MM HG: CPT | Performed by: NURSE PRACTITIONER

## 2025-05-09 PROCEDURE — 1159F MED LIST DOCD IN RCRD: CPT | Performed by: NURSE PRACTITIONER

## 2025-05-09 RX ORDER — VIBEGRON 75 MG/1
75 TABLET, FILM COATED ORAL DAILY
Qty: 90 TABLET | Refills: 3 | Status: SHIPPED | OUTPATIENT
Start: 2025-05-09

## 2025-05-09 ASSESSMENT — PAIN SCALES - GENERAL: PAINLEVEL_OUTOF10: 0-NO PAIN

## 2025-05-09 NOTE — H&P (VIEW-ONLY)
Marina Lopez had a discussion of her overactive bladder.     Laboratory:   Urine dipstick shows negative.        Review of Systems   Constitutional: Negative.    HENT: Negative.     Eyes: Negative.    Respiratory: Negative.     Cardiovascular: Negative.    Gastrointestinal: Negative.    Endocrine: Negative.    Genitourinary:         Improvement in urinary incontinence, decreased urgency, less frequent nocturia   Musculoskeletal: Negative.    Neurological: Negative.    Psychiatric/Behavioral: Negative.          HPI     Urinary Symptoms:  - She reports a significant improvement in urinary incontinence sxs after using Gemtesa samples.  - Notes that upon feeling the urge to urinate, there is now sufficient time to reach the bathroom without leakage.  - Has used all the provided samples of Gemtesa.  - Reports decreased urgency and leaking, and she is very happy with improvements.  - Mentions getting up at night to urinate less frequently, with some nights allowing for 4-5 hours of uninterrupted sleep.  - Associates increased nocturnal urination with the day of taking Trulicity shots, typically on Wednesdays.  - Unsure if Gemtesa has been sent to the pharmacy and prefers a 90-day supply if possible.    Physical Exam  Neurological:      Mental Status: She is alert.   Psychiatric:         Mood and Affect: Mood normal.         Behavior: Behavior normal.         Thought Content: Thought content normal.          Assesment and Plan  69-year-old female being assessed for OAB. Comorbidities include: HTN, HLD, T2DM.     Diagnoses:  #1 Overactive bladder    Plan:  OAB, nocturia  - She reports significant improvement in urinary leakage with the use of Gemtesa samples. Less urgency and leaking, and she can now reach the bathroom without accidents.   - PVR = 6 ml by U/S.  - Rx for Gemtesa sent to patient's preferred pharmacy, with her preference being for 90-day supply if insurance allows. If insurance coverage is an issue, will  provide additional samples and negotiate with the insurance company as needed.  - Reports reduced nocturia, with some nights allowing 4-5 hours of uninterrupted sleep. Increased frequency noted on the night of Trulicity administration, which she associates with the medication.    Discussion  Counseling regarding anti-cholinergic medication: This patient has been prescribed or is currently on an anti-cholinergic medication for treatment of overactive bladder/urge urinary incontinence. The patient was screened for contraindications to use. We reviewed indication for use, potential common side effects, and instructions for use. We reviewed association of anti-cholinergic medication use with increased risk of developing dementia. Alternatives to anti-cholinergic medication and different anti-cholinergic medications were discussed, including theoretical decreased risk with certain anti-cholinergics, and offered as appropriate for the patient. The patient expressed her understanding of the counseling provided. The patient's expressed preference is:? Gemtesa Continue current anti-cholinergic medication.? Trial of anti-cholinergic medication (tolterodine, oxybutynin, solifenacin, fesoterodine).? Trial of anti-cholinergic medication with less blood-brain barrier crossover (trospium, darifenacin).? Trial of mirabegron.? Discontinue all medication for OAB/UUI. Has been on OAB med in the past Yes how many1-2 - Gemtesa     Follow-up with OTIS Schreiber in 3 months to reassess sxs and medication efficacy.    SPEKE audio duration: 5 minutes    I spent a total of eConsult Time: 10 minutes in face to face and non face to face time.     Scribe Attestation  By signing my name below, Miky DAVISON Scribe, attest that this documentation has been prepared under the direction and in the presence of OTIS Soto on 05/09/2025 at 7:01 PM.  Bettye DAVISON APRN-CNP, personally performed the services  described in this documentation which was scribed virtually and I confirm that it is both accurate and complete.     Bettye Silva, APRN-CNP

## 2025-05-13 ENCOUNTER — APPOINTMENT (OUTPATIENT)
Dept: PRIMARY CARE | Facility: CLINIC | Age: 70
End: 2025-05-13
Payer: MEDICARE

## 2025-05-13 ASSESSMENT — ENCOUNTER SYMPTOMS
EYES NEGATIVE: 1
CARDIOVASCULAR NEGATIVE: 1
RESPIRATORY NEGATIVE: 1
PSYCHIATRIC NEGATIVE: 1
NEUROLOGICAL NEGATIVE: 1
CONSTITUTIONAL NEGATIVE: 1
ENDOCRINE NEGATIVE: 1
MUSCULOSKELETAL NEGATIVE: 1
GASTROINTESTINAL NEGATIVE: 1

## 2025-05-28 ENCOUNTER — TELEPHONE (OUTPATIENT)
Dept: PREADMISSION TESTING | Facility: HOSPITAL | Age: 70
End: 2025-05-28
Payer: MEDICARE

## 2025-05-28 NOTE — TELEPHONE ENCOUNTER
SURGERY PRE-OPERATIVE INSTRUCTIONS    *You will receive a phone call the day before your procedure  after 2pm, (or the Friday before your surgery if scheduled on a Monday.) Generally the hospital will be calling you with this information after that time.    *If you are taking GLP1 medications for type 2 diabetes or weight loss, hold these medications on the day of the procedure. START a clear liquid diet 24 hours before your surgery. On the day of your surgery/procedure, STOP all clear liquids 2 hours before your arrival time to the hospital. A clear liquid diet consists of clear liquids and foods that melt into a clear liquid. Clear liquids can and should contain sugar. This is only if you are taking a GLP1 medication.     *You are not to eat after midnight the night before the surgery. You may have up to 10 ounces of clear liquids up until 2 hours prior to arriving to the hospital. The exception is with medications you were instructed to take day of surgery.     *You may take tylenol for pain/discomfort as needed.     *Stop taking all aspirin products, ibuprofen (motrin/advil), naproxen (aleve/naprosyn) for one week prior to surgery.    *Stop taking all vitamins and supplements one week prior to surgery.     *You should not have alcoholic beverages for 24 hours before surgery.     *You should not smoke 24 hours prior to surgery.     *To help prevent surgical infections bathe/shower with Dial soap the evening before surgery.    *You can wear deodorant but no lotion, powder, or perfume/cologne. You should remove all make-up and nail polish at home.    *If you wear glasses, please bring a case for the glasses with you.    *You will be asked to remove dentures and contacts.     *Please leave all valuables at home.    *You should wear loose, comfortable clothing that will accommodate bandages and/or casts.    *You should notify your doctor of any change in your condition (fever, cold, rash, etc). Surgery may need to be  re-scheduled until a time you are in better health.    *A responsible adult is required to accompany you to and from the hospital if you are receiving anesthesia or a sedative. Patients are not permitted to drive for 24 hours after anesthesia.     *You can use the [employer] parking if you wish.     *If you have any further questions please call [phone].

## 2025-05-28 NOTE — TELEPHONE ENCOUNTER
Last dose for trulicity was wed 5/21/25. Instructed not to take today. Takes losartan at hs. Instructed to only take carvedilol morning of procedures.

## 2025-05-29 ENCOUNTER — ANESTHESIA EVENT (OUTPATIENT)
Dept: OPERATING ROOM | Facility: HOSPITAL | Age: 70
End: 2025-05-29
Payer: MEDICARE

## 2025-05-30 ENCOUNTER — ANESTHESIA (OUTPATIENT)
Dept: OPERATING ROOM | Facility: HOSPITAL | Age: 70
End: 2025-05-30
Payer: MEDICARE

## 2025-05-30 ENCOUNTER — HOSPITAL ENCOUNTER (OUTPATIENT)
Dept: OPERATING ROOM | Facility: HOSPITAL | Age: 70
Setting detail: OUTPATIENT SURGERY
Discharge: HOME | End: 2025-05-30
Payer: MEDICARE

## 2025-05-30 VITALS
DIASTOLIC BLOOD PRESSURE: 66 MMHG | OXYGEN SATURATION: 100 % | BODY MASS INDEX: 32.27 KG/M2 | HEIGHT: 63 IN | HEART RATE: 72 BPM | WEIGHT: 182.1 LBS | TEMPERATURE: 96.8 F | SYSTOLIC BLOOD PRESSURE: 141 MMHG | RESPIRATION RATE: 16 BRPM

## 2025-05-30 DIAGNOSIS — Z12.12 SCREENING FOR COLORECTAL CANCER: ICD-10-CM

## 2025-05-30 DIAGNOSIS — Z12.11 SCREENING FOR COLORECTAL CANCER: ICD-10-CM

## 2025-05-30 DIAGNOSIS — R14.2 BELCHING SYMPTOM: ICD-10-CM

## 2025-05-30 LAB — GLUCOSE BLD MANUAL STRIP-MCNC: 154 MG/DL (ref 74–99)

## 2025-05-30 PROCEDURE — 2500000004 HC RX 250 GENERAL PHARMACY W/ HCPCS (ALT 636 FOR OP/ED): Performed by: ANESTHESIOLOGY

## 2025-05-30 PROCEDURE — 3600000007 HC OR TIME - EACH INCREMENTAL 1 MINUTE - PROCEDURE LEVEL TWO: Performed by: STUDENT IN AN ORGANIZED HEALTH CARE EDUCATION/TRAINING PROGRAM

## 2025-05-30 PROCEDURE — 3700000002 HC GENERAL ANESTHESIA TIME - EACH INCREMENTAL 1 MINUTE: Performed by: STUDENT IN AN ORGANIZED HEALTH CARE EDUCATION/TRAINING PROGRAM

## 2025-05-30 PROCEDURE — G0121 COLON CA SCRN NOT HI RSK IND: HCPCS | Performed by: SURGERY

## 2025-05-30 PROCEDURE — 2500000004 HC RX 250 GENERAL PHARMACY W/ HCPCS (ALT 636 FOR OP/ED): Mod: JZ

## 2025-05-30 PROCEDURE — 82947 ASSAY GLUCOSE BLOOD QUANT: CPT

## 2025-05-30 PROCEDURE — 3600000002 HC OR TIME - INITIAL BASE CHARGE - PROCEDURE LEVEL TWO: Performed by: STUDENT IN AN ORGANIZED HEALTH CARE EDUCATION/TRAINING PROGRAM

## 2025-05-30 PROCEDURE — 7100000009 HC PHASE TWO TIME - INITIAL BASE CHARGE: Performed by: STUDENT IN AN ORGANIZED HEALTH CARE EDUCATION/TRAINING PROGRAM

## 2025-05-30 PROCEDURE — 7100000010 HC PHASE TWO TIME - EACH INCREMENTAL 1 MINUTE: Performed by: STUDENT IN AN ORGANIZED HEALTH CARE EDUCATION/TRAINING PROGRAM

## 2025-05-30 PROCEDURE — 3700000001 HC GENERAL ANESTHESIA TIME - INITIAL BASE CHARGE: Performed by: STUDENT IN AN ORGANIZED HEALTH CARE EDUCATION/TRAINING PROGRAM

## 2025-05-30 PROCEDURE — 43239 EGD BIOPSY SINGLE/MULTIPLE: CPT | Performed by: SURGERY

## 2025-05-30 RX ORDER — ONDANSETRON HYDROCHLORIDE 2 MG/ML
4 INJECTION, SOLUTION INTRAVENOUS ONCE AS NEEDED
Status: DISCONTINUED | OUTPATIENT
Start: 2025-05-30 | End: 2025-05-31 | Stop reason: HOSPADM

## 2025-05-30 RX ORDER — SODIUM CHLORIDE, SODIUM LACTATE, POTASSIUM CHLORIDE, CALCIUM CHLORIDE 600; 310; 30; 20 MG/100ML; MG/100ML; MG/100ML; MG/100ML
20 INJECTION, SOLUTION INTRAVENOUS CONTINUOUS
Status: DISCONTINUED | OUTPATIENT
Start: 2025-05-30 | End: 2025-05-31 | Stop reason: HOSPADM

## 2025-05-30 RX ORDER — PROPOFOL 10 MG/ML
INJECTION, EMULSION INTRAVENOUS AS NEEDED
Status: DISCONTINUED | OUTPATIENT
Start: 2025-05-30 | End: 2025-05-30

## 2025-05-30 RX ORDER — DROPERIDOL 2.5 MG/ML
0.62 INJECTION, SOLUTION INTRAMUSCULAR; INTRAVENOUS ONCE AS NEEDED
Status: DISCONTINUED | OUTPATIENT
Start: 2025-05-30 | End: 2025-05-31 | Stop reason: HOSPADM

## 2025-05-30 RX ORDER — LIDOCAINE HCL/PF 100 MG/5ML
SYRINGE (ML) INTRAVENOUS AS NEEDED
Status: DISCONTINUED | OUTPATIENT
Start: 2025-05-30 | End: 2025-05-30

## 2025-05-30 RX ADMIN — LIDOCAINE HYDROCHLORIDE 100 MG: 20 INJECTION INTRAVENOUS at 11:52

## 2025-05-30 RX ADMIN — PROPOFOL 50 MG: 10 INJECTION, EMULSION INTRAVENOUS at 11:54

## 2025-05-30 RX ADMIN — PROPOFOL 50 MG: 10 INJECTION, EMULSION INTRAVENOUS at 11:52

## 2025-05-30 RX ADMIN — PROPOFOL 50 MG: 10 INJECTION, EMULSION INTRAVENOUS at 12:06

## 2025-05-30 RX ADMIN — SODIUM CHLORIDE, POTASSIUM CHLORIDE, SODIUM LACTATE AND CALCIUM CHLORIDE 20 ML/HR: 600; 310; 30; 20 INJECTION, SOLUTION INTRAVENOUS at 10:44

## 2025-05-30 RX ADMIN — PROPOFOL 50 MG: 10 INJECTION, EMULSION INTRAVENOUS at 11:57

## 2025-05-30 RX ADMIN — PROPOFOL 50 MG: 10 INJECTION, EMULSION INTRAVENOUS at 12:02

## 2025-05-30 RX ADMIN — PROPOFOL 50 MG: 10 INJECTION, EMULSION INTRAVENOUS at 12:07

## 2025-05-30 RX ADMIN — PROPOFOL 20 MG: 10 INJECTION, EMULSION INTRAVENOUS at 12:10

## 2025-05-30 ASSESSMENT — PAIN SCALES - GENERAL
PAINLEVEL_OUTOF10: 0 - NO PAIN

## 2025-05-30 ASSESSMENT — PAIN - FUNCTIONAL ASSESSMENT
PAIN_FUNCTIONAL_ASSESSMENT: 0-10

## 2025-05-30 NOTE — DISCHARGE INSTRUCTIONS
Patient Instructions after a Colonoscopy      The anesthetics, sedatives or narcotics which were given to you today will be acting in your body for the next 24 hours, so you might feel a little sleepy or groggy.  This feeling should slowly wear off. Carefully read and follow the instructions.     You received sedation today:  - Do not drive or operate any machinery or power tools of any kind.   - No alcoholic beverages today, not even beer or wine.  - Do not make any important decisions or sign any legal documents.  - No over the counter medications that contain alcohol or that may cause drowsiness.  - Do not make any important decisions or sign any legal documents.  - Make sure you have someone with you for first 24 hours.    While it is common to experience mild to moderate abdominal distention, gas, or belching after your procedure, if any of these symptoms occur following discharge from the GI Lab or within one week of having your procedure, call the [facility] to be advised whether a visit to your nearest Urgent Care or Emergency Department is indicated.  Take this paper with you if you go.     - If you develop an allergic reaction to the medications that were given during your procedure such as difficulty breathing, rash, hives, severe nausea, vomiting or lightheadedness.  - If you experience chest pain, shortness of breath, severe abdominal pain, fevers and chills.  -If you develop signs and symptoms of bleeding such as blood in your spit, if your stools turn black, tarry, or bloody  - If you have not urinated within 8 hours following your procedure.  - If your IV site becomes painful, red, inflamed, or looks infected.    If you received a biopsy/polypectomy/sphincterotomy the following instructions apply below:    __ Do not use Aspirin containing products, non-steroidal medications or anti-coagulants for one week following your procedure. (Examples of these types of medications are: Advil,  Arthrotec, Aleve, Coumadin, Ecotrin, Heparin, Ibuprofen, Indocin, Motrin, Naprosyn, Nuprin, Plavix, Vioxx, and Voltarin, or their generic forms.  This list is not all-inclusive.  Check with your physician or pharmacist before resuming medications.)   __ Eat a soft diet today.  Avoid foods that are poorly digested for the next 24 hours.  These foods would include: nuts, beans, lettuce, red meats, and fried foods. Start with liquids and advance your diet as tolerated, gradually work up to eating solids.   __ Do not have a Barium Study or Enema for one week.    Your physician recommends the additional following instructions:    -You have a contact number available for emergencies. The signs and symptoms of potential delayed complications were discussed with you. You may return to normal activities tomorrow.  -Resume your previous diet.  -Continue your present medications.   -We are waiting for your pathology results.  -Your physician has recommended a repeat colonoscopy (date to be determined after pending pathology results are reviewed) for surveillance based on pathology results.  -The findings and recommendations have been discussed with you.  -The findings and recommendations were discussed with your family.  - Please see Medication Reconciliation Form for new medication/medications prescribed.       If you experience any problems or have any questions following discharge from the GI Lab, please call:        Nurse Signature                                                                        Date___________________                                                                            Patient/Responsible Party Signature                                        Date___________________

## 2025-05-30 NOTE — ANESTHESIA PREPROCEDURE EVALUATION
Patient: Marina Lopez    Procedure Information       Date/Time: 05/30/25 1200    Scheduled providers: Alice Chandler MD; Jennifer Moses MD    Procedures:       COLONOSCOPY      EGD      AMB REFERRAL TO GENERAL SURGERY    Location: [facility] OR            Relevant Problems   Cardiac   (+) Cardiac murmur   (+) Essential hypertension   (+) Mixed hyperlipidemia      Endocrine   (+) Type 2 diabetes mellitus       Clinical information reviewed:   Tobacco  Allergies    Med Hx  Surg Hx  OB Status  Fam Hx  Soc Hx        NPO Detail:  No data recorded     PHYSICAL EXAM    Anesthesia Plan    History of general anesthesia?: yes  History of complications of general anesthesia?: no    ASA 3     MAC     intravenous induction   Anesthetic plan and risks discussed with patient.  Use of blood products discussed with patient who.    Plan discussed with CRNA and attending.

## 2025-05-30 NOTE — ANESTHESIA POSTPROCEDURE EVALUATION
Patient: Marina Lopez    Procedure Summary       Date: 05/30/25 Room / Location: [facility] OR    Anesthesia Start: 1149 Anesthesia Stop: 1221    Procedures:       COLONOSCOPY      EGD      AMB REFERRAL TO GENERAL SURGERY Diagnosis:       Screening for colorectal cancer      Belching symptom    Scheduled Providers: Alice Chandler MD; Jennifer Moses MD Responsible Provider: Jennifer Moses MD    Anesthesia Type: MAC ASA Status: 3            Anesthesia Type: MAC    Vitals Value Taken Time   /66 05/30/25 13:08   Temp  05/30/25 13:44   Pulse 72 05/30/25 13:05   Resp 16 05/30/25 13:05   SpO2 100 % 05/30/25 13:11   Vitals shown include unfiled device data.    Anesthesia Post Evaluation    Patient location during evaluation: PACU  Patient participation: complete - patient participated  Level of consciousness: awake and alert  Pain management: adequate  Airway patency: patent  Cardiovascular status: acceptable  Respiratory status: acceptable  Hydration status: acceptable  Postoperative Nausea and Vomiting: none        No notable events documented.

## 2025-06-13 LAB
LABORATORY COMMENT REPORT: NORMAL
PATH REPORT.FINAL DX SPEC: NORMAL
PATH REPORT.GROSS SPEC: NORMAL
PATH REPORT.RELEVANT HX SPEC: NORMAL
PATH REPORT.TOTAL CANCER: NORMAL

## 2025-06-19 ENCOUNTER — TELEPHONE (OUTPATIENT)
Dept: PRIMARY CARE | Facility: CLINIC | Age: 70
End: 2025-06-19
Payer: MEDICARE

## 2025-06-19 DIAGNOSIS — I10 ESSENTIAL HYPERTENSION: ICD-10-CM

## 2025-06-19 DIAGNOSIS — E11.65 TYPE 2 DIABETES MELLITUS WITH HYPERGLYCEMIA, WITHOUT LONG-TERM CURRENT USE OF INSULIN: ICD-10-CM

## 2025-06-19 RX ORDER — DULAGLUTIDE 4.5 MG/.5ML
4.5 INJECTION, SOLUTION SUBCUTANEOUS WEEKLY
Qty: 2 ML | Refills: 5 | Status: SHIPPED | OUTPATIENT
Start: 2025-06-19

## 2025-06-19 RX ORDER — LOSARTAN POTASSIUM 100 MG/1
100 TABLET ORAL DAILY
Qty: 90 TABLET | Refills: 1 | Status: SHIPPED | OUTPATIENT
Start: 2025-06-19

## 2025-06-19 RX ORDER — CARVEDILOL 6.25 MG/1
6.25 TABLET ORAL
Qty: 180 TABLET | Refills: 1 | Status: SHIPPED | OUTPATIENT
Start: 2025-06-19

## 2025-06-19 NOTE — TELEPHONE ENCOUNTER
MEDICATION REFILL    Pharmacy Name:    [facility] / [place]  Medication requested          Carvedilol    6.25 mg    1 tab 2 x daily  Dulaglutide   4.5 mg pen injector  1 inj weekly         Losartan   100 mg    1 x daily  Dosage [see above ]  Quantity     90 days   Allergies    none  Date of last visit    04/28/2025  Date of Next Appointment   10/30/2025

## 2025-08-20 ENCOUNTER — APPOINTMENT (OUTPATIENT)
Dept: DERMATOLOGY | Facility: CLINIC | Age: 70
End: 2025-08-20
Payer: MEDICARE

## 2025-08-20 DIAGNOSIS — L81.4 LENTIGO: ICD-10-CM

## 2025-08-20 DIAGNOSIS — Z12.83 ENCOUNTER FOR SCREENING FOR MALIGNANT NEOPLASM OF SKIN: ICD-10-CM

## 2025-08-20 DIAGNOSIS — D48.5 NEOPLASM OF UNCERTAIN BEHAVIOR OF SKIN: Primary | ICD-10-CM

## 2025-08-20 DIAGNOSIS — L90.5 SCAR CONDITIONS AND FIBROSIS OF SKIN: ICD-10-CM

## 2025-08-20 DIAGNOSIS — L57.0 ACTINIC KERATOSIS: ICD-10-CM

## 2025-08-20 DIAGNOSIS — L57.9 SKIN CHANGES DUE TO CHRONIC EXPOSURE TO NONIONIZING RADIATION: ICD-10-CM

## 2025-08-20 DIAGNOSIS — D18.01 ANGIOMA OF SKIN: ICD-10-CM

## 2025-08-20 DIAGNOSIS — L82.1 SEBORRHEIC KERATOSIS: ICD-10-CM

## 2025-08-20 DIAGNOSIS — Z85.828 HISTORY OF NONMELANOMA SKIN CANCER: ICD-10-CM

## 2025-08-20 DIAGNOSIS — D22.9 BENIGN NEVUS: ICD-10-CM

## 2025-08-20 PROCEDURE — 1160F RVW MEDS BY RX/DR IN RCRD: CPT | Performed by: NURSE PRACTITIONER

## 2025-08-20 PROCEDURE — 17000 DESTRUCT PREMALG LESION: CPT | Performed by: NURSE PRACTITIONER

## 2025-08-20 PROCEDURE — 1159F MED LIST DOCD IN RCRD: CPT | Performed by: NURSE PRACTITIONER

## 2025-08-20 PROCEDURE — 1036F TOBACCO NON-USER: CPT | Performed by: NURSE PRACTITIONER

## 2025-08-20 PROCEDURE — 99203 OFFICE O/P NEW LOW 30 MIN: CPT | Performed by: NURSE PRACTITIONER

## 2025-08-20 PROCEDURE — 4010F ACE/ARB THERAPY RXD/TAKEN: CPT | Performed by: NURSE PRACTITIONER

## 2025-08-20 PROCEDURE — 17003 DESTRUCT PREMALG LES 2-14: CPT | Performed by: NURSE PRACTITIONER

## 2025-08-20 PROCEDURE — 3044F HG A1C LEVEL LT 7.0%: CPT | Performed by: NURSE PRACTITIONER

## 2025-08-20 PROCEDURE — 11102 TANGNTL BX SKIN SINGLE LES: CPT | Performed by: NURSE PRACTITIONER

## 2025-08-22 LAB
LABORATORY COMMENT REPORT: NORMAL
PATH REPORT.FINAL DX SPEC: NORMAL
PATH REPORT.GROSS SPEC: NORMAL
PATH REPORT.MICROSCOPIC SPEC OTHER STN: NORMAL
PATH REPORT.RELEVANT HX SPEC: NORMAL
PATH REPORT.TOTAL CANCER: NORMAL

## 2025-08-25 DIAGNOSIS — D04.39 SQUAMOUS CELL CARCINOMA IN SITU OF SKIN OF NASAL TIP: ICD-10-CM

## 2025-09-29 ENCOUNTER — APPOINTMENT (OUTPATIENT)
Dept: DERMATOLOGY | Facility: CLINIC | Age: 70
End: 2025-09-29
Payer: MEDICARE

## 2025-10-30 ENCOUNTER — APPOINTMENT (OUTPATIENT)
Dept: PRIMARY CARE | Facility: CLINIC | Age: 70
End: 2025-10-30
Payer: MEDICARE

## 2025-11-14 ENCOUNTER — APPOINTMENT (OUTPATIENT)
Dept: OBSTETRICS AND GYNECOLOGY | Facility: CLINIC | Age: 70
End: 2025-11-14
Payer: MEDICARE